# Patient Record
Sex: FEMALE | Race: WHITE | Employment: UNEMPLOYED | URBAN - METROPOLITAN AREA
[De-identification: names, ages, dates, MRNs, and addresses within clinical notes are randomized per-mention and may not be internally consistent; named-entity substitution may affect disease eponyms.]

---

## 2019-01-08 ENCOUNTER — HOSPITAL ENCOUNTER (OUTPATIENT)
Dept: PREADMISSION TESTING | Age: 54
Discharge: HOME | End: 2019-01-08
Payer: SELF-PAY

## 2019-01-08 VITALS
WEIGHT: 128.13 LBS | HEART RATE: 68 BPM | RESPIRATION RATE: 18 BRPM | TEMPERATURE: 96.7 F | BODY MASS INDEX: 23.58 KG/M2 | HEIGHT: 62 IN | SYSTOLIC BLOOD PRESSURE: 130 MMHG | DIASTOLIC BLOOD PRESSURE: 83 MMHG

## 2019-01-08 LAB
ANION GAP SERPL CALC-SCNC: 6 MMOL/L (ref 5–15)
BASOPHILS # BLD: 0 K/UL (ref 0–0.1)
BASOPHILS NFR BLD: 0 % (ref 0–1)
BUN SERPL-MCNC: 17 MG/DL (ref 6–20)
BUN/CREAT SERPL: 21 (ref 12–20)
CALCIUM SERPL-MCNC: 8.9 MG/DL (ref 8.5–10.1)
CHLORIDE SERPL-SCNC: 105 MMOL/L (ref 97–108)
CO2 SERPL-SCNC: 28 MMOL/L (ref 21–32)
CREAT SERPL-MCNC: 0.8 MG/DL (ref 0.55–1.02)
DIFFERENTIAL METHOD BLD: NORMAL
EOSINOPHIL # BLD: 0 K/UL (ref 0–0.4)
EOSINOPHIL NFR BLD: 1 % (ref 0–7)
ERYTHROCYTE [DISTWIDTH] IN BLOOD BY AUTOMATED COUNT: 12.6 % (ref 11.5–14.5)
GLUCOSE SERPL-MCNC: 91 MG/DL (ref 65–100)
HCT VFR BLD AUTO: 40.3 % (ref 35–47)
HGB BLD-MCNC: 13.3 G/DL (ref 11.5–16)
IMM GRANULOCYTES # BLD: 0 K/UL (ref 0–0.04)
IMM GRANULOCYTES NFR BLD AUTO: 0 % (ref 0–0.5)
LYMPHOCYTES # BLD: 1.4 K/UL (ref 0.8–3.5)
LYMPHOCYTES NFR BLD: 29 % (ref 12–49)
MCH RBC QN AUTO: 31.4 PG (ref 26–34)
MCHC RBC AUTO-ENTMCNC: 33 G/DL (ref 30–36.5)
MCV RBC AUTO: 95 FL (ref 80–99)
MONOCYTES # BLD: 0.4 K/UL (ref 0–1)
MONOCYTES NFR BLD: 9 % (ref 5–13)
NEUTS SEG # BLD: 2.9 K/UL (ref 1.8–8)
NEUTS SEG NFR BLD: 61 % (ref 32–75)
NRBC # BLD: 0 K/UL (ref 0–0.01)
NRBC BLD-RTO: 0 PER 100 WBC
PLATELET # BLD AUTO: 324 K/UL (ref 150–400)
PMV BLD AUTO: 9.4 FL (ref 8.9–12.9)
POTASSIUM SERPL-SCNC: 4.2 MMOL/L (ref 3.5–5.1)
RBC # BLD AUTO: 4.24 M/UL (ref 3.8–5.2)
SODIUM SERPL-SCNC: 139 MMOL/L (ref 136–145)
WBC # BLD AUTO: 4.8 K/UL (ref 3.6–11)

## 2019-01-08 PROCEDURE — 36415 COLL VENOUS BLD VENIPUNCTURE: CPT

## 2019-01-08 PROCEDURE — 85025 COMPLETE CBC W/AUTO DIFF WBC: CPT

## 2019-01-08 PROCEDURE — 80048 BASIC METABOLIC PNL TOTAL CA: CPT

## 2019-01-08 RX ORDER — CYCLOBENZAPRINE HCL 10 MG
10 TABLET ORAL
COMMUNITY
End: 2019-08-05

## 2019-01-08 RX ORDER — ACETAMINOPHEN 500 MG
2000 TABLET ORAL 2 TIMES DAILY
COMMUNITY
End: 2019-08-05

## 2019-01-21 ENCOUNTER — ANESTHESIA EVENT (OUTPATIENT)
Dept: MEDSURG UNIT | Age: 54
End: 2019-01-21
Payer: SELF-PAY

## 2019-01-21 NOTE — H&P
Timothy Idalia  : 1965  DOS: 2019  Chief Complaint: Consultation       Allergies:  No Non-Medication Allergies (NNMA), Compazine       Medications: Synthroid       Medical History: Height: 5' 2\", Weight: 120 lbs     Pulmonary System: Allergies  Cardiac System: Negative  Blood and Liver Systems: Negative  Neurologic and Endocrine Systems: Thyroid trouble  GI,  and Reproductive Systems: Negative  Cancer: Negative   Surgical History: Septoplasty Revision  Breast Augmentation Revision  Abdominoplasty  Breast Implant Revision  Septoplasty   Breast Augmentation       Family History: Heart Disease Father, Cancer Father, High Blood Pressure Mother       Social History: Smoking Status: Never smoker       Ms. Carolee Funes is a pleasant 51-year-old female who presents today with a desire to undergo some secondary breast surgery. She gives a history of primary saline sub muscular breast augmentation approximately 25 years ago with an unknown implant volume, style, or type. She developed a postoperative asymmetry and had a second breast augmentation in , secondary to a saline deflation where she had her implant exchanged for one of the same volume and type. Approximately 12 years ago she developed a capsular contracture on her left breast and had her saline implants exchanged for Allergan gel implants with a 397-cc volume on her right and a 330-cc volume on her left (?). She is interested in addressing the tightness and unnatural feel to her implants and remove her existing implants and exchanging for a new fifth generation cohesive gel device that is slightly larger. She also feels as though her right nipple is slightly lower than her left nipple. She has been pregnant once that resulted in a miscarriage. Her breast cancer history is positive in a paternal grandmother. She currently wears a size 34 D bra. Her last mammogram was in March of this year.     Examination demonstrates obvious palpable breast implants with a Bakers grade 2-3 on the left and a Bakers grade 1-2 on the right. Both implants are a little displaced in a cephalad direction. She does have some scarring and the right nipple is slightly lower than the left by a proximally 1 cm. I had a discussion with Ms. Edward Coreas regarding her physical exam and her desire to have additional elective breast surgery. She understands this is performed under a general anesthetic on an outpatient basis. I diagrammed on paper and her breasts where a pee areolar approach would be performed to gain access to her existing implants and her capsules. The old implant will be removed and total capsulectomy would be performed through a pee areolar approach. A new implant that is a smooth round cohesive silicone gel breast implant of a slightly larger volume would be placed. Additional efforts can be made on the right to elevate the nipple areolar complex approximately 1 cm to match her left side. All skin incisions are closed in layers with dissolvable sutures and a compression garment is used with associated activity restrictions while she is healing which can extend beyond 6 weeks. There is no exercise for 4 weeks. The risks and complications include but are not limited to infection, pain, bleeding, hematomas requiring re-operation, skin sensitivity changes, vascular compromise to tissues resulting in partial or complete loss of tissues, resultant open wounds, the need for long term wound care and dressing changes and scarring, irregularities and asymmetries requiring touch-up and revision surgery, an unacceptable cosmetic result, and other things including cardiac and pulmonary risks that include death. Additional implant specific risks include capsular contracture, scalloping or rippling, implant malposition, implant failure, and implant infection. We also discussed RAVEN-ALCL.     If she has an approximately 400 cc volume on the right and a 325-cc volume on the left and she wants to go slightly larger, I would possibly put a 450 on her right and a 375 on her left. We will be prepared with a selection of variable sizes intraoperatively. Her questions were answered and pictures were taken. Shell meet with Ramo Mckeon to discuss the fees.

## 2019-01-22 ENCOUNTER — HOSPITAL ENCOUNTER (EMERGENCY)
Age: 54
Discharge: HOME | End: 2019-01-23
Attending: EMERGENCY MEDICINE | Admitting: EMERGENCY MEDICINE
Payer: COMMERCIAL

## 2019-01-22 ENCOUNTER — ANESTHESIA (OUTPATIENT)
Dept: MEDSURG UNIT | Age: 54
End: 2019-01-22
Payer: SELF-PAY

## 2019-01-22 ENCOUNTER — HOSPITAL ENCOUNTER (OUTPATIENT)
Age: 54
Setting detail: OUTPATIENT SURGERY
Discharge: HOME | End: 2019-01-22
Attending: SURGERY | Admitting: SURGERY
Payer: SELF-PAY

## 2019-01-22 VITALS
WEIGHT: 128 LBS | DIASTOLIC BLOOD PRESSURE: 75 MMHG | OXYGEN SATURATION: 95 % | HEIGHT: 62 IN | HEART RATE: 94 BPM | TEMPERATURE: 97.5 F | RESPIRATION RATE: 16 BRPM | BODY MASS INDEX: 23.55 KG/M2 | SYSTOLIC BLOOD PRESSURE: 96 MMHG

## 2019-01-22 DIAGNOSIS — R51.9 NONINTRACTABLE HEADACHE, UNSPECIFIED CHRONICITY PATTERN, UNSPECIFIED HEADACHE TYPE: Primary | ICD-10-CM

## 2019-01-22 DIAGNOSIS — R11.2 NAUSEA AND VOMITING, INTRACTABILITY OF VOMITING NOT SPECIFIED, UNSPECIFIED VOMITING TYPE: ICD-10-CM

## 2019-01-22 PROBLEM — Z41.1 ENCOUNTER FOR COSMETIC SURGERY: Status: ACTIVE | Noted: 2019-01-22

## 2019-01-22 LAB
ANION GAP BLD CALC-SCNC: 17 MMOL/L (ref 10–20)
BUN BLD-MCNC: 12 MG/DL (ref 9–20)
CA-I BLD-MCNC: 1.09 MMOL/L (ref 1.12–1.32)
CHLORIDE BLD-SCNC: 102 MMOL/L (ref 98–107)
CO2 BLD-SCNC: 24 MMOL/L (ref 21–32)
COMMENT, HOLDF: NORMAL
CREAT BLD-MCNC: 0.7 MG/DL (ref 0.6–1.3)
GLUCOSE BLD-MCNC: 126 MG/DL (ref 65–100)
HCT VFR BLD CALC: 42 % (ref 35–47)
POTASSIUM BLD-SCNC: 3.8 MMOL/L (ref 3.5–5.1)
SAMPLES BEING HELD,HOLD: NORMAL
SERVICE CMNT-IMP: ABNORMAL
SODIUM BLD-SCNC: 139 MMOL/L (ref 136–145)

## 2019-01-22 PROCEDURE — 74011250637 HC RX REV CODE- 250/637: Performed by: PHYSICIAN ASSISTANT

## 2019-01-22 PROCEDURE — 77030026227 HC FUNL KELLR 2 PCH ALGN -C: Performed by: SURGERY

## 2019-01-22 PROCEDURE — 77030026438 HC STYL ET INTUB CARD -A: Performed by: ANESTHESIOLOGY

## 2019-01-22 PROCEDURE — 77030019908 HC STETH ESOPH SIMS -A: Performed by: ANESTHESIOLOGY

## 2019-01-22 PROCEDURE — 80047 BASIC METABLC PNL IONIZED CA: CPT

## 2019-01-22 PROCEDURE — 76060000063 HC AMB SURG ANES 1.5 TO 2 HR: Performed by: SURGERY

## 2019-01-22 PROCEDURE — 77030018836 HC SOL IRR NACL ICUM -A: Performed by: SURGERY

## 2019-01-22 PROCEDURE — 74011250636 HC RX REV CODE- 250/636: Performed by: SURGERY

## 2019-01-22 PROCEDURE — 74011250636 HC RX REV CODE- 250/636

## 2019-01-22 PROCEDURE — 74011000250 HC RX REV CODE- 250: Performed by: SURGERY

## 2019-01-22 PROCEDURE — 76210000046 HC AMBSU PH II REC FIRST 0.5 HR: Performed by: SURGERY

## 2019-01-22 PROCEDURE — 74011000250 HC RX REV CODE- 250

## 2019-01-22 PROCEDURE — 76210000036 HC AMBSU PH I REC 1.5 TO 2 HR: Performed by: SURGERY

## 2019-01-22 PROCEDURE — 96375 TX/PRO/DX INJ NEW DRUG ADDON: CPT

## 2019-01-22 PROCEDURE — 77030008684 HC TU ET CUF COVD -B: Performed by: ANESTHESIOLOGY

## 2019-01-22 PROCEDURE — 74011000272 HC RX REV CODE- 272: Performed by: SURGERY

## 2019-01-22 PROCEDURE — 77030002933 HC SUT MCRYL J&J -A: Performed by: SURGERY

## 2019-01-22 PROCEDURE — 77030032490 HC SLV COMPR SCD KNE COVD -B: Performed by: SURGERY

## 2019-01-22 PROCEDURE — 76030000003 HC AMB SURG OR TIME 1.5 TO 2: Performed by: SURGERY

## 2019-01-22 PROCEDURE — 96361 HYDRATE IV INFUSION ADD-ON: CPT

## 2019-01-22 PROCEDURE — 77030011264 HC ELECTRD BLD EXT COVD -A: Performed by: SURGERY

## 2019-01-22 PROCEDURE — 77030031139 HC SUT VCRL2 J&J -A: Performed by: SURGERY

## 2019-01-22 PROCEDURE — 74011250636 HC RX REV CODE- 250/636: Performed by: PHYSICIAN ASSISTANT

## 2019-01-22 PROCEDURE — 77030020263 HC SOL INJ SOD CL0.9% LFCR 1000ML: Performed by: SURGERY

## 2019-01-22 PROCEDURE — 99283 EMERGENCY DEPT VISIT LOW MDM: CPT

## 2019-01-22 PROCEDURE — 77030011640 HC PAD GRND REM COVD -A: Performed by: SURGERY

## 2019-01-22 PROCEDURE — 96374 THER/PROPH/DIAG INJ IV PUSH: CPT

## 2019-01-22 PROCEDURE — 77030020782 HC GWN BAIR PAWS FLX 3M -B

## 2019-01-22 DEVICE — SMOOTH ROUND MODERATE PLUS PROFILE GEL-FILLED BREAST IMPLANT, 425CC  SMOOTH ROUND SILICONE
Type: IMPLANTABLE DEVICE | Site: BREAST | Status: FUNCTIONAL
Brand: MENTOR MEMORYGEL BREAST IMPLANT

## 2019-01-22 RX ORDER — SODIUM CHLORIDE 0.9 % (FLUSH) 0.9 %
5-40 SYRINGE (ML) INJECTION EVERY 8 HOURS
Status: DISCONTINUED | OUTPATIENT
Start: 2019-01-22 | End: 2019-01-22 | Stop reason: HOSPADM

## 2019-01-22 RX ORDER — MIDAZOLAM HYDROCHLORIDE 1 MG/ML
INJECTION, SOLUTION INTRAMUSCULAR; INTRAVENOUS AS NEEDED
Status: DISCONTINUED | OUTPATIENT
Start: 2019-01-22 | End: 2019-01-22 | Stop reason: HOSPADM

## 2019-01-22 RX ORDER — BUTALBITAL, ACETAMINOPHEN AND CAFFEINE 50; 325; 40 MG/1; MG/1; MG/1
2 TABLET ORAL
Status: COMPLETED | OUTPATIENT
Start: 2019-01-22 | End: 2019-01-22

## 2019-01-22 RX ORDER — SODIUM CHLORIDE 9 MG/ML
1000 INJECTION, SOLUTION INTRAVENOUS ONCE
Status: COMPLETED | OUTPATIENT
Start: 2019-01-22 | End: 2019-01-23

## 2019-01-22 RX ORDER — SUCCINYLCHOLINE CHLORIDE 20 MG/ML
INJECTION INTRAMUSCULAR; INTRAVENOUS AS NEEDED
Status: DISCONTINUED | OUTPATIENT
Start: 2019-01-22 | End: 2019-01-22 | Stop reason: HOSPADM

## 2019-01-22 RX ORDER — ONDANSETRON 2 MG/ML
INJECTION INTRAMUSCULAR; INTRAVENOUS AS NEEDED
Status: DISCONTINUED | OUTPATIENT
Start: 2019-01-22 | End: 2019-01-22 | Stop reason: HOSPADM

## 2019-01-22 RX ORDER — HYDROMORPHONE HYDROCHLORIDE 2 MG/ML
INJECTION, SOLUTION INTRAMUSCULAR; INTRAVENOUS; SUBCUTANEOUS AS NEEDED
Status: DISCONTINUED | OUTPATIENT
Start: 2019-01-22 | End: 2019-01-22 | Stop reason: HOSPADM

## 2019-01-22 RX ORDER — SODIUM CHLORIDE 0.9 % (FLUSH) 0.9 %
5-40 SYRINGE (ML) INJECTION AS NEEDED
Status: DISCONTINUED | OUTPATIENT
Start: 2019-01-22 | End: 2019-01-22 | Stop reason: HOSPADM

## 2019-01-22 RX ORDER — ONDANSETRON 2 MG/ML
4 INJECTION INTRAMUSCULAR; INTRAVENOUS AS NEEDED
Status: DISCONTINUED | OUTPATIENT
Start: 2019-01-22 | End: 2019-01-22 | Stop reason: HOSPADM

## 2019-01-22 RX ORDER — FENTANYL CITRATE 50 UG/ML
25 INJECTION, SOLUTION INTRAMUSCULAR; INTRAVENOUS
Status: DISCONTINUED | OUTPATIENT
Start: 2019-01-22 | End: 2019-01-22 | Stop reason: HOSPADM

## 2019-01-22 RX ORDER — NEOSTIGMINE METHYLSULFATE 1 MG/ML
INJECTION INTRAVENOUS AS NEEDED
Status: DISCONTINUED | OUTPATIENT
Start: 2019-01-22 | End: 2019-01-22 | Stop reason: HOSPADM

## 2019-01-22 RX ORDER — ROCURONIUM BROMIDE 10 MG/ML
INJECTION, SOLUTION INTRAVENOUS AS NEEDED
Status: DISCONTINUED | OUTPATIENT
Start: 2019-01-22 | End: 2019-01-22 | Stop reason: HOSPADM

## 2019-01-22 RX ORDER — SODIUM CHLORIDE, SODIUM LACTATE, POTASSIUM CHLORIDE, CALCIUM CHLORIDE 600; 310; 30; 20 MG/100ML; MG/100ML; MG/100ML; MG/100ML
50 INJECTION, SOLUTION INTRAVENOUS CONTINUOUS
Status: DISCONTINUED | OUTPATIENT
Start: 2019-01-22 | End: 2019-01-22 | Stop reason: HOSPADM

## 2019-01-22 RX ORDER — DEXAMETHASONE SODIUM PHOSPHATE 4 MG/ML
INJECTION, SOLUTION INTRA-ARTICULAR; INTRALESIONAL; INTRAMUSCULAR; INTRAVENOUS; SOFT TISSUE AS NEEDED
Status: DISCONTINUED | OUTPATIENT
Start: 2019-01-22 | End: 2019-01-22 | Stop reason: HOSPADM

## 2019-01-22 RX ORDER — LIDOCAINE HYDROCHLORIDE 20 MG/ML
INJECTION, SOLUTION EPIDURAL; INFILTRATION; INTRACAUDAL; PERINEURAL AS NEEDED
Status: DISCONTINUED | OUTPATIENT
Start: 2019-01-22 | End: 2019-01-22 | Stop reason: HOSPADM

## 2019-01-22 RX ORDER — SODIUM CHLORIDE, SODIUM LACTATE, POTASSIUM CHLORIDE, CALCIUM CHLORIDE 600; 310; 30; 20 MG/100ML; MG/100ML; MG/100ML; MG/100ML
INJECTION, SOLUTION INTRAVENOUS
Status: DISCONTINUED | OUTPATIENT
Start: 2019-01-22 | End: 2019-01-22 | Stop reason: HOSPADM

## 2019-01-22 RX ORDER — KETOROLAC TROMETHAMINE 30 MG/ML
15 INJECTION, SOLUTION INTRAMUSCULAR; INTRAVENOUS
Status: COMPLETED | OUTPATIENT
Start: 2019-01-22 | End: 2019-01-22

## 2019-01-22 RX ORDER — CEFAZOLIN SODIUM/WATER 2 G/20 ML
2 SYRINGE (ML) INTRAVENOUS ONCE
Status: COMPLETED | OUTPATIENT
Start: 2019-01-22 | End: 2019-01-22

## 2019-01-22 RX ORDER — PROPOFOL 10 MG/ML
INJECTION, EMULSION INTRAVENOUS AS NEEDED
Status: DISCONTINUED | OUTPATIENT
Start: 2019-01-22 | End: 2019-01-22 | Stop reason: HOSPADM

## 2019-01-22 RX ORDER — LIDOCAINE HYDROCHLORIDE 10 MG/ML
0.1 INJECTION, SOLUTION EPIDURAL; INFILTRATION; INTRACAUDAL; PERINEURAL AS NEEDED
Status: DISCONTINUED | OUTPATIENT
Start: 2019-01-22 | End: 2019-01-22 | Stop reason: HOSPADM

## 2019-01-22 RX ORDER — PHENYLEPHRINE HCL IN 0.9% NACL 0.4MG/10ML
SYRINGE (ML) INTRAVENOUS AS NEEDED
Status: DISCONTINUED | OUTPATIENT
Start: 2019-01-22 | End: 2019-01-22 | Stop reason: HOSPADM

## 2019-01-22 RX ORDER — METOCLOPRAMIDE HYDROCHLORIDE 5 MG/ML
10 INJECTION INTRAMUSCULAR; INTRAVENOUS
Status: COMPLETED | OUTPATIENT
Start: 2019-01-22 | End: 2019-01-22

## 2019-01-22 RX ORDER — DIPHENHYDRAMINE HYDROCHLORIDE 50 MG/ML
25 INJECTION, SOLUTION INTRAMUSCULAR; INTRAVENOUS
Status: COMPLETED | OUTPATIENT
Start: 2019-01-22 | End: 2019-01-22

## 2019-01-22 RX ORDER — GLYCOPYRROLATE 0.2 MG/ML
INJECTION INTRAMUSCULAR; INTRAVENOUS AS NEEDED
Status: DISCONTINUED | OUTPATIENT
Start: 2019-01-22 | End: 2019-01-22 | Stop reason: HOSPADM

## 2019-01-22 RX ORDER — FENTANYL CITRATE 50 UG/ML
INJECTION, SOLUTION INTRAMUSCULAR; INTRAVENOUS AS NEEDED
Status: DISCONTINUED | OUTPATIENT
Start: 2019-01-22 | End: 2019-01-22 | Stop reason: HOSPADM

## 2019-01-22 RX ADMIN — GLYCOPYRROLATE 0.5 MG: 0.2 INJECTION INTRAMUSCULAR; INTRAVENOUS at 13:11

## 2019-01-22 RX ADMIN — SODIUM CHLORIDE 1000 ML/HR: 900 INJECTION, SOLUTION INTRAVENOUS at 23:30

## 2019-01-22 RX ADMIN — Medication 80 MCG: at 11:38

## 2019-01-22 RX ADMIN — SUCCINYLCHOLINE CHLORIDE 120 MG: 20 INJECTION INTRAMUSCULAR; INTRAVENOUS at 11:34

## 2019-01-22 RX ADMIN — LIDOCAINE HYDROCHLORIDE 50 MG: 20 INJECTION, SOLUTION EPIDURAL; INFILTRATION; INTRACAUDAL; PERINEURAL at 11:33

## 2019-01-22 RX ADMIN — FENTANYL CITRATE 50 MCG: 50 INJECTION, SOLUTION INTRAMUSCULAR; INTRAVENOUS at 11:33

## 2019-01-22 RX ADMIN — HYDROMORPHONE HYDROCHLORIDE 0.5 MG: 2 INJECTION, SOLUTION INTRAMUSCULAR; INTRAVENOUS; SUBCUTANEOUS at 11:58

## 2019-01-22 RX ADMIN — METOCLOPRAMIDE 10 MG: 5 INJECTION, SOLUTION INTRAMUSCULAR; INTRAVENOUS at 23:34

## 2019-01-22 RX ADMIN — ONDANSETRON 4 MG: 2 INJECTION INTRAMUSCULAR; INTRAVENOUS at 11:38

## 2019-01-22 RX ADMIN — Medication 2 G: at 11:42

## 2019-01-22 RX ADMIN — KETOROLAC TROMETHAMINE 15 MG: 30 INJECTION, SOLUTION INTRAMUSCULAR at 23:30

## 2019-01-22 RX ADMIN — PROPOFOL 150 MG: 10 INJECTION, EMULSION INTRAVENOUS at 11:33

## 2019-01-22 RX ADMIN — MIDAZOLAM HYDROCHLORIDE 2 MG: 1 INJECTION, SOLUTION INTRAMUSCULAR; INTRAVENOUS at 11:25

## 2019-01-22 RX ADMIN — DEXAMETHASONE SODIUM PHOSPHATE 8 MG: 4 INJECTION, SOLUTION INTRA-ARTICULAR; INTRALESIONAL; INTRAMUSCULAR; INTRAVENOUS; SOFT TISSUE at 11:38

## 2019-01-22 RX ADMIN — NEOSTIGMINE METHYLSULFATE 3 MG: 1 INJECTION INTRAVENOUS at 13:11

## 2019-01-22 RX ADMIN — DIPHENHYDRAMINE HYDROCHLORIDE 25 MG: 50 INJECTION, SOLUTION INTRAMUSCULAR; INTRAVENOUS at 23:32

## 2019-01-22 RX ADMIN — Medication 40 MCG: at 11:36

## 2019-01-22 RX ADMIN — ROCURONIUM BROMIDE 25 MG: 10 INJECTION, SOLUTION INTRAVENOUS at 11:40

## 2019-01-22 RX ADMIN — Medication 80 MCG: at 11:43

## 2019-01-22 RX ADMIN — SODIUM CHLORIDE, SODIUM LACTATE, POTASSIUM CHLORIDE, CALCIUM CHLORIDE: 600; 310; 30; 20 INJECTION, SOLUTION INTRAVENOUS at 11:15

## 2019-01-22 RX ADMIN — ONDANSETRON 4 MG: 2 INJECTION INTRAMUSCULAR; INTRAVENOUS at 13:15

## 2019-01-22 RX ADMIN — ROCURONIUM BROMIDE 5 MG: 10 INJECTION, SOLUTION INTRAVENOUS at 11:33

## 2019-01-22 RX ADMIN — BUTALBITAL, ACETAMINOPHEN AND CAFFEINE 2 TABLET: 50; 325; 40 TABLET ORAL at 23:34

## 2019-01-22 RX ADMIN — HYDROMORPHONE HYDROCHLORIDE 0.5 MG: 2 INJECTION, SOLUTION INTRAMUSCULAR; INTRAVENOUS; SUBCUTANEOUS at 13:21

## 2019-01-22 RX ADMIN — FENTANYL CITRATE 50 MCG: 50 INJECTION, SOLUTION INTRAMUSCULAR; INTRAVENOUS at 11:25

## 2019-01-22 NOTE — INTERVAL H&P NOTE
H&P Update:  Keven Lorenz was seen and examined. History and physical has been reviewed. The patient has been examined.  There have been no significant clinical changes since the completion of the originally dated History and Physical.    Signed By: Dewayne Romero MD     January 22, 2019 10:55 AM

## 2019-01-22 NOTE — ANESTHESIA POSTPROCEDURE EVALUATION
Post-Anesthesia Evaluation and Assessment    Patient: Sumit Lucas MRN: [id number]  SSN: [id number]    YOB: 1965  Age: 48 y.o. Sex: female      I have evaluated the patient and they are stable and ready for discharge from the PACU. Cardiovascular Function/Vital Signs  Visit Vitals  /60   Pulse (!) 101   Temp 36.4 °C (97.5 °F)   Resp 16   Ht 5' 2\" (1.575 m)   Wt 58.1 kg (128 lb)   SpO2 96%   BMI 23.41 kg/m²       Patient is status post General anesthesia for Procedure(s):  BILATERAL BREAST IMPLANT REMOVAL AND REPLACEMENT. Nausea/Vomiting: None    Postoperative hydration reviewed and adequate. Pain:  Pain Scale 1: FLACC (01/22/19 1320)  Pain Intensity 1: 0 (01/22/19 1320)   Managed    Neurological Status:   Neuro (WDL): Exceptions to WDL (01/22/19 1320)  Neuro  Neurologic State: Eyes open to voice (01/22/19 1320)  LUE Motor Response: Purposeful (01/22/19 1320)  LLE Motor Response: Purposeful (01/22/19 1320)  RUE Motor Response: Purposeful (01/22/19 1320)  RLE Motor Response: Purposeful (01/22/19 1320)   At baseline    Mental Status, Level of Consciousness: Alert and  oriented to person, place, and time    Pulmonary Status:   O2 Device: Room air (01/22/19 1324)   Adequate oxygenation and airway patent    Complications related to anesthesia: None    Post-anesthesia assessment completed.  No concerns    Signed By: Alberto Jackman MD     January 22, 2019              Procedure(s):  BILATERAL BREAST IMPLANT REMOVAL AND REPLACEMENT.    <BSHSIANPOST>    Visit Vitals  /60   Pulse (!) 101   Temp 36.4 °C (97.5 °F)   Resp 16   Ht 5' 2\" (1.575 m)   Wt 58.1 kg (128 lb)   SpO2 96%   BMI 23.41 kg/m²

## 2019-01-22 NOTE — BRIEF OP NOTE
BRIEF OPERATIVE NOTE    Date of Procedure: 1/22/2019   Preoperative Diagnosis: COSMETIC  Postoperative Diagnosis: COSMETIC    Procedure(s):  BILATERAL BREAST IMPLANT REMOVAL AND REPLACEMENT  Surgeon(s) and Role:     * Lawanda San MD - Primary         Surgical Assistant: Karen/Elissa/Airam    Surgical Staff:  Circ-1: Machelle Jones, RN  Registered Nurse First Assistant: Danny Guadalupe RN  Scrub Tech-1: Marely Etienne  Scrub RN-Relief: Lucas, [place], RN  Event Time In Time Out   Incision Start 1157    Incision Close 1310      Anesthesia: General   Estimated Blood Loss: 5  Specimens: * No specimens in log *   Findings: ruptered   Complications: none  Implants:   Implant Name Type Inv.  Item Serial No.  Lot No. LRB No. Used Action   SMOOTH ROUND MODERATE PROFILE GEL-FILLED BREAST IMPLANT   [id number] [employer] [id number] Left 1 Implanted   IMPL BRST RND MP 425ML --  - [id number]  IMPL BRST RND MP 425ML --  [id number] [employer] [id number] Right 1 Implanted

## 2019-01-22 NOTE — DISCHARGE INSTRUCTIONS
Leave the surgical garment ON and DRY for 48 hours, then may remove for shower. Resume any pre op medications and diet BUT use sport drinks like gator aid or power aid instead of water and extra protein in diet helps wounds heal.  Keep head elevated at night when you sleep about 35 degrees, do not lay flat for about 2 weeks  Walk every 1-2 hours during the day in house for 5-10 minutes during the first 2 weeks  May tub bathe only during the first 2 days  Use stool softeners to prevent constipation  Do not use pain mediations on an empty stomach  NO strenuous activity for 4 weeks  When you shower, remove the bra/garment and the white elastic implant strap, discard any lose gauze, shower, place antibiotic ointment of choice over the nipple incisions with clean gauze, and place the second garment on as you found the first one. Be sure to safety pin the white elastic strap to the back/bottom of the bra as you found it, and use for 2 weeks. Call DR. Renae Gross at [phone] for questions or problems  Anticipate a phone call from Dr. Amara Xie from Nurse    PATIENT INSTRUCTIONS:    After general anesthesia or intravenous sedation, for 24 hours or while taking prescription Narcotics:  · Limit your activities  · Do not drive and operate hazardous machinery  · Do not make important personal or business decisions  · Do  not drink alcoholic beverages  · If you have not urinated within 8 hours after discharge, please contact your surgeon on call.     Report the following to your surgeon:  · Excessive pain, swelling, redness or odor of or around the surgical area  · Temperature over 100.5  · Nausea and vomiting lasting longer than 4 hours or if unable to take medications  · Any signs of decreased circulation or nerve impairment to extremity: change in color, persistent  numbness, tingling, coldness or increase pain  · Any questions    What to do at Home:  Recommended activity: See surgical instructions. If you experience any of the following symptoms as noted above, please follow up with Dr. Javier Patino. *  Please give a list of your current medications to your Primary Care Provider. *  Please update this list whenever your medications are discontinued, doses are      changed, or new medications (including over-the-counter products) are added. *  Please carry medication information at all times in case of emergency situations. These are general instructions for a healthy lifestyle:    No smoking/ No tobacco products/ Avoid exposure to second hand smoke  Surgeon General's Warning:  Quitting smoking now greatly reduces serious risk to your health. Obesity, smoking, and sedentary lifestyle greatly increases your risk for illness    A healthy diet, regular physical exercise & weight monitoring are important for maintaining a healthy lifestyle    You may be retaining fluid if you have a history of heart failure or if you experience any of the following symptoms:  Weight gain of 3 pounds or more overnight or 5 pounds in a week, increased swelling in our hands or feet or shortness of breath while lying flat in bed. Please call your doctor as soon as you notice any of these symptoms; do not wait until your next office visit. Recognize signs and symptoms of STROKE:    F-face looks uneven    A-arms unable to move or move unevenly    S-speech slurred or non-existent    T-time-call 911 as soon as signs and symptoms begin-DO NOT go       Back to bed or wait to see if you get better-TIME IS BRAIN. Warning Signs of HEART ATTACK     Call 911 if you have these symptoms:   Chest discomfort. Most heart attacks involve discomfort in the center of the chest that lasts more than a few minutes, or that goes away and comes back. It can feel like uncomfortable pressure, squeezing, fullness, or pain.  Discomfort in other areas of the upper body.  Symptoms can include pain or discomfort in one or both arms, the back, neck, jaw, or stomach.  Shortness of breath with or without chest discomfort.  Other signs may include breaking out in a cold sweat, nausea, or lightheadedness. Don't wait more than five minutes to call 911 - MINUTES MATTER! Fast action can save your life. Calling 911 is almost always the fastest way to get lifesaving treatment. Emergency Medical Services staff can begin treatment when they arrive -- up to an hour sooner than if someone gets to the hospital by car. The discharge information has been reviewed with the patient. The patient verbalized understanding. Discharge medications reviewed with the patient and appropriate educational materials and side effects teaching were provided.   ___________________________________________________________________________________________________________________________________

## 2019-01-22 NOTE — ANESTHESIA PREPROCEDURE EVALUATION
Anesthetic History     PONV          Review of Systems / Medical History  Patient summary reviewed, nursing notes reviewed and pertinent labs reviewed    Pulmonary                   Neuro/Psych              Cardiovascular                       GI/Hepatic/Renal                Endo/Other      Hypothyroidism       Other Findings              Physical Exam    Airway  Mallampati: I  TM Distance: > 6 cm  Neck ROM: normal range of motion   Mouth opening: Normal     Cardiovascular    Rhythm: regular  Rate: normal         Dental  No notable dental hx       Pulmonary  Breath sounds clear to auscultation               Abdominal         Other Findings            Anesthetic Plan    ASA: 2  Anesthesia type: general          Induction: Intravenous  Anesthetic plan and risks discussed with: Patient

## 2019-01-22 NOTE — ROUTINE PROCESS
Patient: Rainer Del Real MRN: [id number]  SSN: [id number]   YOB: 1965  Age: 48 y.o. Sex: female     Patient is status post Procedure(s):  BILATERAL BREAST IMPLANT REMOVAL AND REPLACEMENT. Surgeon(s) and Role:     * Letser Villela MD - Primary    Local/Dose/Irrigation:  See mar                  Peripheral IV 01/22/19 Left Antecubital (Active)   Site Assessment Clean, dry, & intact 1/22/2019 11:11 AM   Phlebitis Assessment 0 1/22/2019 11:11 AM   Infiltration Assessment 0 1/22/2019 11:11 AM   Dressing Status Clean, dry, & intact 1/22/2019 11:11 AM   Dressing Type Transparent;Tape 1/22/2019 11:11 AM   Hub Color/Line Status Pink; Infusing 1/22/2019 11:11 AM            Airway - Endotracheal Tube 01/22/19 Oral (Active)                   Dressing/Packing:  Wound Breast-DRESSING TYPE: 4 x 4;Adhesive wound closure strips (Steri-Strips); Adhesive wound dressing (Mastisol); Bra (01/22/19 1100)  Splint/Cast:  ]    Other:

## 2019-01-22 NOTE — OP NOTES
[address]   OPERATIVE REPORT    Elmira Odell  MR#: [id number]  : 1965  ACCOUNT #: [de-identified]   DATE OF SERVICE: 2019    PREOPERATIVE DIAGNOSIS:  Personal history of previous elective breast augmentation, submuscular silicone gel, postoperative asymmetry, desires implant exchange. POSTOPERATIVE DIAGNOSIS:  Personal history of previous elective breast augmentation, submuscular silicone gel postoperative asymmetry, desires implant exchange. PROCEDURE PERFORMED:  Bilateral implant exchange, periareolar approach and capsulorrhaphy adjustments bilateral breasts. SURGEON:  Tereza Oliver MD    ANESTHESIA:  General.    FINDINGS:  Normal with tight scar contracture, lower pole bilateral breasts. INDICATION FOR PROCEDURE:  The patient is a pleasant 26-year-old female who was seen in the [address] [place] [address] with a desire to undergo secondary revision breast surgery. She gives a history of a primary saline submuscular augmentation 25 years ago with an unknown implant volume, style, and type. She developed a postoperative asymmetry and a saline deflation and in  underwent an implant exchange by Dr. Raul Dowd. He placed an Allergan 397 mL volume on her right breast and 330 mL volume on the left. She was seen recently with some asymmetry and a scar contracture extending from the 6 o'clock position of the nipple areolar complexes towards the inframammary fold. She was interested in an implant exchange to a slightly larger device and preoperatively selected a 425 mL smooth round cohesive silicone gel breast implants for her right side, and a 385 mL implant on the left. She comes in today for this elective procedure. OPERATIVE PROCEDURE:  After appropriate consent was obtained, preoperative markings were placed. She was taken to the operating room and placed on the operative table in the supine position.   Satisfactory general endotracheal anesthesia was obtained. SCD devices were placed per routine. Our local anesthesia solution was injected around each breast for postoperative analgesia and her chest was sterilely prepped and draped. We began on the right side, performing the identical procedure bilateral,  beginning at the lower aspect of the nipple areolar complex. An inferior periareolar incision was made with a #10 scalpel blade and electrocautery was used to dissect through the subcutaneous tissues to the implant capsule. The old cohesive gel implant, which was identified, was a 397 mL implant on the right was removed. Adjustments were made in the capsule as radial relaxing incisions from the lower aspect of the nipple areolar complex towards the inframammary fold in an effort to address the contour defect and the constriction band below the nipple. Once this was performed, the pocket was inspected for hemostasis and was irrigated with bacitracin irrigation. At this point, a [place], reference number [id number] BC smooth round moderate plus profile 425 mL cohesive gel implant was placed in the pocket. This implant had a lot number [id number]. On the left side, the same inferior periareolar incision was made gaining access to the pocket with electrocautery. It was at this point; however, we identified the implant was ruptured with free flowing silicone gel material that was not cohesive in the pocket. Every attempt was made to remove as much of the gel as possible. The pocket was also irrigated with bacitracin irrigation and the same radial relaxing incisions in the capsule were made with the electrocautery under direct laparoscopic visualization. At this point, a [place], reference number [id number] [facility] smooth round moderate classic profile 385 mL implant was placed in the left pocket. That implant had a lot number [id number]. The patient was sat upright to inspect shape, size, and symmetry and was felt to be acceptable.   The inferior periareolar incisions were then  closed, reapproximating the deep tissues including any fascia or capsule with 3-0 Vicryl, the dermis with a 3-0 Vicryl, and the skin with a 4-0 Monocryl. Xeroform gauze, 4 x 4 gauze and a surgical bra with a wide implant strap were placed. At the end of the procedure, sponge and needle counts were reported as correct. ESTIMATED BLOOD LOSS:  5 mL. IV FLUIDS:  1000 mL. DRAINS:  None. IMPLANTS:  425 mL implant, right, 385 mL implant, left previously described. SPECIMENS REMOVED:  None. FINDINGS: Intraoperative identification of ruptured silicone gel implant left breast.    ASSISTANT:  Lorne Baker. STAFF:  OR staff  room 4, seventh floor. COMPLICATIONS:  None. She was awakened, extubated, and transferred to recovery room in satisfactory and stable condition. She will be discharged home today in the care of her family with prescriptions and instructions and will follow up with me within 2 weeks.       Fred Mckeon MD       [address] / JEF  D: 01/22/2019 13:32     T: 01/22/2019 13:54  JOB #: [id number]  CC: Sahil Roper MD

## 2019-01-23 VITALS
SYSTOLIC BLOOD PRESSURE: 114 MMHG | OXYGEN SATURATION: 100 % | TEMPERATURE: 98 F | RESPIRATION RATE: 16 BRPM | HEART RATE: 98 BPM | DIASTOLIC BLOOD PRESSURE: 77 MMHG

## 2019-01-23 PROCEDURE — 74011250636 HC RX REV CODE- 250/636: Performed by: PHYSICIAN ASSISTANT

## 2019-01-23 PROCEDURE — 96375 TX/PRO/DX INJ NEW DRUG ADDON: CPT

## 2019-01-23 PROCEDURE — 96361 HYDRATE IV INFUSION ADD-ON: CPT

## 2019-01-23 RX ORDER — LORAZEPAM 2 MG/ML
1 INJECTION INTRAMUSCULAR
Status: COMPLETED | OUTPATIENT
Start: 2019-01-23 | End: 2019-01-23

## 2019-01-23 RX ADMIN — LORAZEPAM 1 MG: 2 INJECTION INTRAMUSCULAR; INTRAVENOUS at 00:49

## 2019-01-23 NOTE — ED NOTES
I have reviewed discharge instructions with the patient. The patient verbalized understanding. VSS, respirations even and unlabored and in no acute distress. Ambulated out of the department with a steady gait. Patient's family member is driving her home.

## 2019-01-23 NOTE — ED TRIAGE NOTES
Patient presents to the emergency department reporting headache. Patient had breast surgery today. Patient arrived with nausea and vomiting.

## 2019-01-23 NOTE — DISCHARGE INSTRUCTIONS

## 2019-01-23 NOTE — ED PROVIDER NOTES
This is a 49 yo AAf with medical hx remarkable for migraines, and thyroid disease presenting ambulatory to the ED with complaint of HA, nausea, vomiting and photophobia since late afternoon today. Pt had breast augmentation surgery today and apparently went home, ate and felt fine for several hrs, then developed HA, generalized with the associated nausea and vomiting. Taking hydrocodone, scopalamine patch in place and zofran with minimal improvement. Similar to prior migraines but has not had migraine in several years. Denies fever, neck stiffness, ear pain, sore throat, cough, CP, SOB, abdominal pain or urinary complaint. Also took medication with caffeine and aspirin.              Past Medical History:   Diagnosis Date    Migraines     Nausea & vomiting     Thyroid disease        Past Surgical History:   Procedure Laterality Date    ABDOMEN SURGERY PROC UNLISTED  2004    abdominoplasty    HX BREAST AUGMENTATION  1994    bilaterql breast augmentation    HX BREAST AUGMENTATION  2007    revision    HX COLONOSCOPY      HX GYN  2011    hysteroscopy    HX HEENT  2001 and 2013    septoplasty  x2    HX HEENT      WISDOM TEETH X4    HX HYSTERECTOMY  2015    HX ORTHOPAEDIC Right 2000     hand ganglion cystectomy         Family History:   Problem Relation Age of Onset    Hypertension Mother     Cancer Father         prostate/ non hodgkins lymphoma(remission), MULTIPLE MYELOMA    Thyroid Disease Sister     Anesth Problems Sister         POST OP NAUSEA    No Known Problems Brother        Social History     Socioeconomic History    Marital status: SINGLE     Spouse name: Not on file    Number of children: Not on file    Years of education: Not on file    Highest education level: Not on file   Social Needs    Financial resource strain: Not on file    Food insecurity - worry: Not on file    Food insecurity - inability: Not on file    Transportation needs - medical: Not on file   [employer] needs - non-medical: Not on file   Occupational History    Not on file   Tobacco Use    Smoking status: Never Smoker    Smokeless tobacco: Never Used   Substance and Sexual Activity    Alcohol use: Yes     Alcohol/week: 3.0 oz     Types: 5 Glasses of wine per week    Drug use: No    Sexual activity: Not on file   Other Topics Concern    Not on file   Social History Narrative    Not on file         ALLERGIES: Compazine [prochlorperazine edisylate]; Darvocet a500 [propoxyphene n-acetaminophen]; Percocet [oxycodone-acetaminophen]; and Prednisone    Review of Systems   Constitutional: Negative. Negative for chills, fatigue and fever. HENT: Negative. Negative for congestion, ear pain, facial swelling, rhinorrhea, sneezing and sore throat. Eyes: Positive for photophobia. Respiratory: Negative for cough, chest tightness and shortness of breath. Cardiovascular: Negative. Negative for chest pain and leg swelling. Gastrointestinal: Positive for nausea and vomiting. Negative for abdominal distention, abdominal pain, constipation and diarrhea. Genitourinary: Negative for difficulty urinating, frequency and urgency. Musculoskeletal: Negative for arthralgias, back pain, joint swelling, neck pain and neck stiffness. Skin: Negative for color change and rash. Neurological: Positive for headaches. Negative for dizziness and numbness. Psychiatric/Behavioral: Negative for confusion and decreased concentration. All other systems reviewed and are negative. Vitals:    01/22/19 2257 01/22/19 2304   BP:  134/87   Pulse: (!) 115 (!) 107   Resp:  18   Temp:  97.5 °F (36.4 °C)   SpO2: 99% 100%            Physical Exam   Constitutional: She is oriented to person, place, and time. She appears well-developed and well-nourished. No distress. Photophobic  female in NAD   HENT:   Head: Normocephalic and atraumatic.    Left Ear: External ear normal.   Eyes: Conjunctivae are normal. Pupils are equal, round, and reactive to light. Neck: Normal range of motion. Neck supple. No meningeal irritation   Cardiovascular: Normal rate, regular rhythm and normal heart sounds. Pulmonary/Chest: Effort normal. No respiratory distress. She has no wheezes. Abdominal: Soft. Bowel sounds are normal. She exhibits no distension. There is no tenderness. There is no rebound. Musculoskeletal: Normal range of motion. Neurological: She is alert and oriented to person, place, and time. No sensory deficit. She exhibits normal muscle tone. Coordination normal.   Skin: Skin is warm and dry. No ecchymosis, no laceration and no lesion noted. Psychiatric: She has a normal mood and affect. Her behavior is normal.   Nursing note and vitals reviewed. MDM  Number of Diagnoses or Management Options  Nausea and vomiting, intractability of vomiting not specified, unspecified vomiting type:   Nonintractable headache, unspecified chronicity pattern, unspecified headache type:   Diagnosis management comments: 49 yo  female with complaint of HA, N/V and photophobia similar to prior migraines. Plan  IVF  toradol  Benadryl   reglan  Reassess. Brie Barone           Procedures    Progress note    Pt report nausea and vomiting improved. HA significantly improved. Brie Barone    Patient's results have been reviewed with them. Patient and/or family have verbally conveyed their understanding and agreement of the patient's signs, symptoms, diagnosis, treatment and prognosis and additionally agree to follow up as recommended or return to the Emergency Room should their condition change prior to follow-up. Discharge instructions have also been provided to the patient with some educational information regarding their diagnosis as well a list of reasons why they would want to return to the ER prior to their follow-up appointment should their condition change.  Brie Barone      Discussed case with attending Physician Harper Gr. Agrees with care and will D/C with follow up.   April C [facility]

## 2019-08-05 ENCOUNTER — HOSPITAL ENCOUNTER (OUTPATIENT)
Dept: PREADMISSION TESTING | Age: 54
Discharge: HOME | End: 2019-08-05
Payer: SELF-PAY

## 2019-08-05 VITALS
WEIGHT: 127.25 LBS | SYSTOLIC BLOOD PRESSURE: 129 MMHG | HEART RATE: 61 BPM | BODY MASS INDEX: 23.42 KG/M2 | HEIGHT: 62 IN | TEMPERATURE: 97.3 F | DIASTOLIC BLOOD PRESSURE: 77 MMHG | RESPIRATION RATE: 18 BRPM

## 2019-08-05 LAB
BASOPHILS # BLD: 0 K/UL (ref 0–0.1)
BASOPHILS NFR BLD: 1 % (ref 0–1)
DIFFERENTIAL METHOD BLD: NORMAL
EOSINOPHIL # BLD: 0 K/UL (ref 0–0.4)
EOSINOPHIL NFR BLD: 0 % (ref 0–7)
ERYTHROCYTE [DISTWIDTH] IN BLOOD BY AUTOMATED COUNT: 12.5 % (ref 11.5–14.5)
HCT VFR BLD AUTO: 41.9 % (ref 35–47)
HGB BLD-MCNC: 13.9 G/DL (ref 11.5–16)
IMM GRANULOCYTES # BLD AUTO: 0 K/UL (ref 0–0.04)
IMM GRANULOCYTES NFR BLD AUTO: 0 % (ref 0–0.5)
LYMPHOCYTES # BLD: 1.4 K/UL (ref 0.8–3.5)
LYMPHOCYTES NFR BLD: 20 % (ref 12–49)
MCH RBC QN AUTO: 32 PG (ref 26–34)
MCHC RBC AUTO-ENTMCNC: 33.2 G/DL (ref 30–36.5)
MCV RBC AUTO: 96.3 FL (ref 80–99)
MONOCYTES # BLD: 0.6 K/UL (ref 0–1)
MONOCYTES NFR BLD: 9 % (ref 5–13)
NEUTS SEG # BLD: 4.7 K/UL (ref 1.8–8)
NEUTS SEG NFR BLD: 70 % (ref 32–75)
NRBC # BLD: 0 K/UL (ref 0–0.01)
NRBC BLD-RTO: 0 PER 100 WBC
PLATELET # BLD AUTO: 350 K/UL (ref 150–400)
PMV BLD AUTO: 10 FL (ref 8.9–12.9)
RBC # BLD AUTO: 4.35 M/UL (ref 3.8–5.2)
WBC # BLD AUTO: 6.8 K/UL (ref 3.6–11)

## 2019-08-05 PROCEDURE — 36415 COLL VENOUS BLD VENIPUNCTURE: CPT

## 2019-08-05 PROCEDURE — 85025 COMPLETE CBC W/AUTO DIFF WBC: CPT

## 2019-08-05 RX ORDER — GLUCOSAMINE SULFATE 1500 MG
2000 POWDER IN PACKET (EA) ORAL
COMMUNITY
End: 2021-09-08

## 2019-08-05 RX ORDER — OMEGA-3-ACID ETHYL ESTERS 1 G/1
2 CAPSULE, LIQUID FILLED ORAL 2 TIMES DAILY WITH MEALS
COMMUNITY
End: 2021-05-10

## 2019-08-05 NOTE — PERIOP NOTES
Preoperative instructions reviewed with patient. Patient given two bottles of CHG soap. Instructions on use of CHG soap. Patient given SSI infection FAQS sheet, Patient was given the opportunity to ask questions on the information provided.

## 2019-08-13 ENCOUNTER — ANESTHESIA (OUTPATIENT)
Dept: MEDSURG UNIT | Age: 54
End: 2019-08-13
Payer: SELF-PAY

## 2019-08-13 ENCOUNTER — ANESTHESIA EVENT (OUTPATIENT)
Dept: MEDSURG UNIT | Age: 54
End: 2019-08-13
Payer: SELF-PAY

## 2019-08-13 ENCOUNTER — HOSPITAL ENCOUNTER (OUTPATIENT)
Age: 54
Setting detail: OUTPATIENT SURGERY
Discharge: HOME | End: 2019-08-13
Attending: SURGERY | Admitting: SURGERY
Payer: SELF-PAY

## 2019-08-13 VITALS
DIASTOLIC BLOOD PRESSURE: 59 MMHG | TEMPERATURE: 97.7 F | BODY MASS INDEX: 23.37 KG/M2 | OXYGEN SATURATION: 99 % | RESPIRATION RATE: 20 BRPM | HEIGHT: 62 IN | HEART RATE: 76 BPM | SYSTOLIC BLOOD PRESSURE: 110 MMHG | WEIGHT: 127 LBS

## 2019-08-13 PROCEDURE — 76210000046 HC AMBSU PH II REC FIRST 0.5 HR: Performed by: SURGERY

## 2019-08-13 PROCEDURE — 77030008684 HC TU ET CUF COVD -B: Performed by: ANESTHESIOLOGY

## 2019-08-13 PROCEDURE — 77030031139 HC SUT VCRL2 J&J -A: Performed by: SURGERY

## 2019-08-13 PROCEDURE — 77030013079 HC BLNKT BAIR HGGR 3M -A: Performed by: ANESTHESIOLOGY

## 2019-08-13 PROCEDURE — 76210000035 HC AMBSU PH I REC 1 TO 1.5 HR: Performed by: SURGERY

## 2019-08-13 PROCEDURE — 77030020263 HC SOL INJ SOD CL0.9% LFCR 1000ML: Performed by: SURGERY

## 2019-08-13 PROCEDURE — 77030002916 HC SUT ETHLN J&J -A: Performed by: SURGERY

## 2019-08-13 PROCEDURE — 74011250636 HC RX REV CODE- 250/636: Performed by: ANESTHESIOLOGY

## 2019-08-13 PROCEDURE — 77030040361 HC SLV COMPR DVT MDII -B: Performed by: SURGERY

## 2019-08-13 PROCEDURE — 77030002986 HC SUT PROL J&J -A: Performed by: SURGERY

## 2019-08-13 PROCEDURE — 74011000250 HC RX REV CODE- 250: Performed by: SURGERY

## 2019-08-13 PROCEDURE — 74011000258 HC RX REV CODE- 258: Performed by: SURGERY

## 2019-08-13 PROCEDURE — 77030026438 HC STYL ET INTUB CARD -A: Performed by: ANESTHESIOLOGY

## 2019-08-13 PROCEDURE — 77030018836 HC SOL IRR NACL ICUM -A: Performed by: SURGERY

## 2019-08-13 PROCEDURE — 74011000250 HC RX REV CODE- 250: Performed by: NURSE ANESTHETIST, CERTIFIED REGISTERED

## 2019-08-13 PROCEDURE — 77030008467 HC STPLR SKN COVD -B: Performed by: SURGERY

## 2019-08-13 PROCEDURE — 74011250636 HC RX REV CODE- 250/636: Performed by: NURSE ANESTHETIST, CERTIFIED REGISTERED

## 2019-08-13 PROCEDURE — 74011000272 HC RX REV CODE- 272: Performed by: SURGERY

## 2019-08-13 PROCEDURE — 77030020782 HC GWN BAIR PAWS FLX 3M -B

## 2019-08-13 PROCEDURE — 77030002933 HC SUT MCRYL J&J -A: Performed by: SURGERY

## 2019-08-13 PROCEDURE — 77030002936 HC SUT MERS J&J -A: Performed by: SURGERY

## 2019-08-13 PROCEDURE — 76030000006 HC AMB SURG OR TIME 3 TO 3.5: Performed by: SURGERY

## 2019-08-13 PROCEDURE — 77030011266 HC ELECTRD BLD INSL COVD -A: Performed by: SURGERY

## 2019-08-13 PROCEDURE — 77030011640 HC PAD GRND REM COVD -A: Performed by: SURGERY

## 2019-08-13 PROCEDURE — 77030003666 HC NDL SPINAL BD -A: Performed by: SURGERY

## 2019-08-13 PROCEDURE — 74011250637 HC RX REV CODE- 250/637: Performed by: ANESTHESIOLOGY

## 2019-08-13 PROCEDURE — 76060000066 HC AMB SURG ANES 3 TO 3.5 HR: Performed by: SURGERY

## 2019-08-13 PROCEDURE — 74011250636 HC RX REV CODE- 250/636: Performed by: SURGERY

## 2019-08-13 PROCEDURE — 77030026227 HC FUNL KELLR 2 PCH ALGN -C: Performed by: SURGERY

## 2019-08-13 RX ORDER — EPHEDRINE SULFATE/0.9% NACL/PF 50 MG/5 ML
5 SYRINGE (ML) INTRAVENOUS AS NEEDED
Status: DISCONTINUED | OUTPATIENT
Start: 2019-08-13 | End: 2019-08-13 | Stop reason: HOSPADM

## 2019-08-13 RX ORDER — LIDOCAINE HYDROCHLORIDE 20 MG/ML
INJECTION, SOLUTION EPIDURAL; INFILTRATION; INTRACAUDAL; PERINEURAL AS NEEDED
Status: DISCONTINUED | OUTPATIENT
Start: 2019-08-13 | End: 2019-08-13 | Stop reason: HOSPADM

## 2019-08-13 RX ORDER — SODIUM CHLORIDE 9 MG/ML
1000 INJECTION, SOLUTION INTRAVENOUS CONTINUOUS
Status: DISCONTINUED | OUTPATIENT
Start: 2019-08-13 | End: 2019-08-13 | Stop reason: HOSPADM

## 2019-08-13 RX ORDER — DEXAMETHASONE SODIUM PHOSPHATE 4 MG/ML
INJECTION, SOLUTION INTRA-ARTICULAR; INTRALESIONAL; INTRAMUSCULAR; INTRAVENOUS; SOFT TISSUE AS NEEDED
Status: DISCONTINUED | OUTPATIENT
Start: 2019-08-13 | End: 2019-08-13 | Stop reason: HOSPADM

## 2019-08-13 RX ORDER — PROPOFOL 10 MG/ML
INJECTION, EMULSION INTRAVENOUS
Status: DISCONTINUED | OUTPATIENT
Start: 2019-08-13 | End: 2019-08-13 | Stop reason: HOSPADM

## 2019-08-13 RX ORDER — FENTANYL CITRATE 50 UG/ML
50 INJECTION, SOLUTION INTRAMUSCULAR; INTRAVENOUS AS NEEDED
Status: DISCONTINUED | OUTPATIENT
Start: 2019-08-13 | End: 2019-08-13 | Stop reason: HOSPADM

## 2019-08-13 RX ORDER — SODIUM CHLORIDE 9 MG/ML
50 INJECTION, SOLUTION INTRAVENOUS CONTINUOUS
Status: DISCONTINUED | OUTPATIENT
Start: 2019-08-13 | End: 2019-08-13 | Stop reason: HOSPADM

## 2019-08-13 RX ORDER — PROPOFOL 10 MG/ML
INJECTION, EMULSION INTRAVENOUS AS NEEDED
Status: DISCONTINUED | OUTPATIENT
Start: 2019-08-13 | End: 2019-08-13 | Stop reason: HOSPADM

## 2019-08-13 RX ORDER — FENTANYL CITRATE 50 UG/ML
25 INJECTION, SOLUTION INTRAMUSCULAR; INTRAVENOUS
Status: DISCONTINUED | OUTPATIENT
Start: 2019-08-13 | End: 2019-08-13 | Stop reason: HOSPADM

## 2019-08-13 RX ORDER — MIDAZOLAM HYDROCHLORIDE 1 MG/ML
0.5 INJECTION, SOLUTION INTRAMUSCULAR; INTRAVENOUS
Status: DISCONTINUED | OUTPATIENT
Start: 2019-08-13 | End: 2019-08-13 | Stop reason: HOSPADM

## 2019-08-13 RX ORDER — HYDROMORPHONE HYDROCHLORIDE 1 MG/ML
0.2 INJECTION, SOLUTION INTRAMUSCULAR; INTRAVENOUS; SUBCUTANEOUS
Status: DISCONTINUED | OUTPATIENT
Start: 2019-08-13 | End: 2019-08-13 | Stop reason: HOSPADM

## 2019-08-13 RX ORDER — DIPHENHYDRAMINE HYDROCHLORIDE 50 MG/ML
12.5 INJECTION, SOLUTION INTRAMUSCULAR; INTRAVENOUS AS NEEDED
Status: DISCONTINUED | OUTPATIENT
Start: 2019-08-13 | End: 2019-08-13 | Stop reason: HOSPADM

## 2019-08-13 RX ORDER — SODIUM CHLORIDE, SODIUM LACTATE, POTASSIUM CHLORIDE, CALCIUM CHLORIDE 600; 310; 30; 20 MG/100ML; MG/100ML; MG/100ML; MG/100ML
125 INJECTION, SOLUTION INTRAVENOUS CONTINUOUS
Status: DISCONTINUED | OUTPATIENT
Start: 2019-08-13 | End: 2019-08-13 | Stop reason: HOSPADM

## 2019-08-13 RX ORDER — MORPHINE SULFATE 10 MG/ML
2 INJECTION, SOLUTION INTRAMUSCULAR; INTRAVENOUS
Status: DISCONTINUED | OUTPATIENT
Start: 2019-08-13 | End: 2019-08-13 | Stop reason: HOSPADM

## 2019-08-13 RX ORDER — SODIUM CHLORIDE 0.9 % (FLUSH) 0.9 %
5-40 SYRINGE (ML) INJECTION EVERY 8 HOURS
Status: DISCONTINUED | OUTPATIENT
Start: 2019-08-13 | End: 2019-08-13 | Stop reason: HOSPADM

## 2019-08-13 RX ORDER — SODIUM CHLORIDE 0.9 % (FLUSH) 0.9 %
5-40 SYRINGE (ML) INJECTION AS NEEDED
Status: DISCONTINUED | OUTPATIENT
Start: 2019-08-13 | End: 2019-08-13 | Stop reason: HOSPADM

## 2019-08-13 RX ORDER — DEXMEDETOMIDINE HYDROCHLORIDE 100 UG/ML
INJECTION, SOLUTION INTRAVENOUS AS NEEDED
Status: DISCONTINUED | OUTPATIENT
Start: 2019-08-13 | End: 2019-08-13 | Stop reason: HOSPADM

## 2019-08-13 RX ORDER — CEFAZOLIN SODIUM/WATER 2 G/20 ML
2 SYRINGE (ML) INTRAVENOUS ONCE
Status: CANCELLED | OUTPATIENT
Start: 2019-08-13 | End: 2019-08-13

## 2019-08-13 RX ORDER — SCOLOPAMINE TRANSDERMAL SYSTEM 1 MG/1
1 PATCH, EXTENDED RELEASE TRANSDERMAL ONCE
Status: DISCONTINUED | OUTPATIENT
Start: 2019-08-13 | End: 2019-08-13 | Stop reason: HOSPADM

## 2019-08-13 RX ORDER — MIDAZOLAM HYDROCHLORIDE 1 MG/ML
1 INJECTION, SOLUTION INTRAMUSCULAR; INTRAVENOUS AS NEEDED
Status: DISCONTINUED | OUTPATIENT
Start: 2019-08-13 | End: 2019-08-13 | Stop reason: HOSPADM

## 2019-08-13 RX ORDER — ONDANSETRON 2 MG/ML
4 INJECTION INTRAMUSCULAR; INTRAVENOUS AS NEEDED
Status: DISCONTINUED | OUTPATIENT
Start: 2019-08-13 | End: 2019-08-13 | Stop reason: HOSPADM

## 2019-08-13 RX ORDER — MIDAZOLAM HYDROCHLORIDE 1 MG/ML
INJECTION, SOLUTION INTRAMUSCULAR; INTRAVENOUS AS NEEDED
Status: DISCONTINUED | OUTPATIENT
Start: 2019-08-13 | End: 2019-08-13 | Stop reason: HOSPADM

## 2019-08-13 RX ORDER — CEFAZOLIN SODIUM/WATER 2 G/20 ML
2 SYRINGE (ML) INTRAVENOUS ONCE
Status: COMPLETED | OUTPATIENT
Start: 2019-08-13 | End: 2019-08-13

## 2019-08-13 RX ORDER — ACETAMINOPHEN 10 MG/ML
INJECTION, SOLUTION INTRAVENOUS AS NEEDED
Status: DISCONTINUED | OUTPATIENT
Start: 2019-08-13 | End: 2019-08-13 | Stop reason: HOSPADM

## 2019-08-13 RX ORDER — PHENYLEPHRINE HCL IN 0.9% NACL 0.4MG/10ML
SYRINGE (ML) INTRAVENOUS AS NEEDED
Status: DISCONTINUED | OUTPATIENT
Start: 2019-08-13 | End: 2019-08-13 | Stop reason: HOSPADM

## 2019-08-13 RX ORDER — LIDOCAINE HYDROCHLORIDE 10 MG/ML
0.1 INJECTION, SOLUTION EPIDURAL; INFILTRATION; INTRACAUDAL; PERINEURAL AS NEEDED
Status: DISCONTINUED | OUTPATIENT
Start: 2019-08-13 | End: 2019-08-13 | Stop reason: HOSPADM

## 2019-08-13 RX ORDER — FENTANYL CITRATE 50 UG/ML
INJECTION, SOLUTION INTRAMUSCULAR; INTRAVENOUS AS NEEDED
Status: DISCONTINUED | OUTPATIENT
Start: 2019-08-13 | End: 2019-08-13 | Stop reason: HOSPADM

## 2019-08-13 RX ORDER — ROCURONIUM BROMIDE 10 MG/ML
INJECTION, SOLUTION INTRAVENOUS AS NEEDED
Status: DISCONTINUED | OUTPATIENT
Start: 2019-08-13 | End: 2019-08-13 | Stop reason: HOSPADM

## 2019-08-13 RX ADMIN — ROCURONIUM BROMIDE 30 MG: 10 SOLUTION INTRAVENOUS at 11:28

## 2019-08-13 RX ADMIN — ROCURONIUM BROMIDE 10 MG: 10 SOLUTION INTRAVENOUS at 12:32

## 2019-08-13 RX ADMIN — FENTANYL CITRATE 25 MCG: 50 INJECTION, SOLUTION INTRAMUSCULAR; INTRAVENOUS at 11:51

## 2019-08-13 RX ADMIN — PROPOFOL 30 MG: 10 INJECTION, EMULSION INTRAVENOUS at 12:00

## 2019-08-13 RX ADMIN — SUGAMMADEX 200 MG: 100 INJECTION, SOLUTION INTRAVENOUS at 13:17

## 2019-08-13 RX ADMIN — Medication 80 MCG: at 13:00

## 2019-08-13 RX ADMIN — PROPOFOL 150 MCG/KG/MIN: 10 INJECTION, EMULSION INTRAVENOUS at 11:28

## 2019-08-13 RX ADMIN — Medication 120 MCG: at 13:09

## 2019-08-13 RX ADMIN — DEXMEDETOMIDINE HYDROCHLORIDE 4 MCG: 100 INJECTION, SOLUTION, CONCENTRATE INTRAVENOUS at 11:38

## 2019-08-13 RX ADMIN — DEXMEDETOMIDINE HYDROCHLORIDE 4 MCG: 100 INJECTION, SOLUTION, CONCENTRATE INTRAVENOUS at 12:35

## 2019-08-13 RX ADMIN — MIDAZOLAM 2 MG: 1 INJECTION INTRAMUSCULAR; INTRAVENOUS at 11:20

## 2019-08-13 RX ADMIN — DEXMEDETOMIDINE HYDROCHLORIDE 4 MCG: 100 INJECTION, SOLUTION, CONCENTRATE INTRAVENOUS at 11:53

## 2019-08-13 RX ADMIN — ACETAMINOPHEN 1000 MG: 10 INJECTION, SOLUTION INTRAVENOUS at 12:18

## 2019-08-13 RX ADMIN — FENTANYL CITRATE 25 MCG: 50 INJECTION, SOLUTION INTRAMUSCULAR; INTRAVENOUS at 11:28

## 2019-08-13 RX ADMIN — Medication 80 MCG: at 12:05

## 2019-08-13 RX ADMIN — DEXAMETHASONE SODIUM PHOSPHATE 8 MG: 4 INJECTION, SOLUTION INTRAMUSCULAR; INTRAVENOUS at 11:31

## 2019-08-13 RX ADMIN — Medication 2 G: at 11:37

## 2019-08-13 RX ADMIN — LIDOCAINE HYDROCHLORIDE 80 MG: 20 INJECTION, SOLUTION EPIDURAL; INFILTRATION; INTRACAUDAL; PERINEURAL at 11:28

## 2019-08-13 RX ADMIN — SODIUM CHLORIDE, SODIUM LACTATE, POTASSIUM CHLORIDE, AND CALCIUM CHLORIDE 125 ML/HR: 600; 310; 30; 20 INJECTION, SOLUTION INTRAVENOUS at 10:37

## 2019-08-13 RX ADMIN — FENTANYL CITRATE 25 MCG: 50 INJECTION, SOLUTION INTRAMUSCULAR; INTRAVENOUS at 12:00

## 2019-08-13 RX ADMIN — PROPOFOL 50 MG: 10 INJECTION, EMULSION INTRAVENOUS at 12:32

## 2019-08-13 RX ADMIN — PROPOFOL 50 MG: 10 INJECTION, EMULSION INTRAVENOUS at 11:52

## 2019-08-13 RX ADMIN — PROMETHAZINE HYDROCHLORIDE 25 MG: 25 INJECTION INTRAMUSCULAR; INTRAVENOUS at 12:40

## 2019-08-13 RX ADMIN — PROPOFOL 120 MG: 10 INJECTION, EMULSION INTRAVENOUS at 11:28

## 2019-08-13 RX ADMIN — ROCURONIUM BROMIDE 10 MG: 10 SOLUTION INTRAVENOUS at 12:00

## 2019-08-13 NOTE — BRIEF OP NOTE
BRIEF OPERATIVE NOTE    Date of Procedure: 8/13/2019   Preoperative Diagnosis: COSMETIC  Postoperative Diagnosis: COSMETIC    Procedure(s):  UNILATERAL (LEFT) DONUT BREAST MASTOPEXY, UNILATERAL (RIGHT) BREAST IMPLANT REMOVAL AND EXCHANGE  Surgeon(s) and Role:     * Nikhil Schaffer MD - Primary         Surgical Assistant: Stella/rosalinda/Susan    Surgical Staff:  Circ-1: Purvi Lai RN  Circ-Relief: La Sandoval RN  Registered Nurse Assistant: Meena Christiansen RN  Scrub RN-1: Leopold Camps, RN  Scrub RN-Relief: Trinh Shipley RN  Event Time In Time Out   Incision Start 1150    Incision Close 1309      Anesthesia: General   Estimated Blood Loss: 20  Specimens:   ID Type Source Tests Collected by Time Destination   1 : IMPLANT REMOVED FROM RIGHT BREAST DISCARDED PER DR Daniel Cantu MD 8/13/2019 1315 Discarded      Findings: as expected   Complications: none  Implants:   Implant Name Type Inv.  Item Serial No.  Lot No. LRB No. Used Action   BREAST IMPLANT   [id number]  [id number] Right 1 Implanted

## 2019-08-13 NOTE — ROUTINE PROCESS
Patient: Tashi Asif MRN: [id number]  SSN: [id number]   YOB: 1965  Age: 48 y.o. Sex: female     Patient is status post Procedure(s):  UNILATERAL (LEFT) DONUT BREAST MASTOPEXY, UNILATERAL (RIGHT) BREAST IMPLANT REMOVAL AND EXCHANGE.     Surgeon(s) and Role:     * Garima Valencia MD - Primary    Local/Dose/Irrigation:  SEE MAR                  Peripheral IV 08/13/19 Left Forearm (Active)   Site Assessment Clean, dry, & intact 8/13/2019 10:34 AM   Dressing Status Clean, dry, & intact 8/13/2019 10:34 AM   Dressing Type Transparent 8/13/2019 10:34 AM                           Dressing/Packing:       Splint/Cast:  ]    Other:

## 2019-08-13 NOTE — ANESTHESIA PREPROCEDURE EVALUATION
Anesthetic History     PONV          Review of Systems / Medical History  Patient summary reviewed, nursing notes reviewed and pertinent labs reviewed    Pulmonary                   Neuro/Psych              Cardiovascular                       GI/Hepatic/Renal                Endo/Other      Hypothyroidism       Other Findings              Physical Exam    Airway  Mallampati: I  TM Distance: > 6 cm  Neck ROM: normal range of motion   Mouth opening: Normal     Cardiovascular    Rhythm: regular  Rate: normal         Dental  No notable dental hx       Pulmonary  Breath sounds clear to auscultation               Abdominal         Other Findings            Anesthetic Plan    ASA: 2  Anesthesia type: general and total IV anesthesia          Induction: Intravenous  Anesthetic plan and risks discussed with: Patient

## 2019-08-13 NOTE — H&P
Office Note      Chief Complaint: Consultation       Allergies: Percocet, No Non-Medication Allergies (NNMA), Compazine       Medications: Estradiol, spironolactone 50 mg oral tablet, Synthroid       Medical History: Height: 5' 2\", Weight: 120 lbs    Pulmonary System: Allergies  Cardiac System: Negative  Blood and Liver Systems: Negative  Neurologic and Endocrine Systems: Thyroid Trouble  GI,  and Reproductive Systems: Negative  Cancer: Negative   Surgical History: Partial hysterectomy (ovaries retained),  Septoplasty Revision,  Breast Augmentation Revision,  Abdominoplasty,  Breast Implant Revision,  Septoplasty,  Breast Augmentation       Family History: Heart Disease Father, High Blood Pressure Mother, Cancer Father       Social History: Smoking Status: Never smoker    Alcohol Use Drinks per week: 4-5     Pregnancy Number of pregnancies:1    Number of children:0              Ms. Meryle Salts is a 72-year-old female who is a 7-months postop from an implant exchange to a 385-cc moderate classic implant on the left, and a 425-cc moderate plus cohesive gel implant on the right. She had some soft tissue/scar efforts around the nipples on both sides. She has been seen a few times in follow-up, in is concerned about a persistent asymmetry regarding volume and appearance. Her left breast has a greater degree of projection and fullness despite the fact that the implant is 40 cc smaller. She is interested now in having a larger implant placed on the right, and efforts around the left nipple areolar complex to change the degree of projection and the appearance. We have discussed this in the past, and she is committed to an additional 50-75 cc of volume added to her right implant, and a pee areolar/doughnut mastopexy on the left side. Examination demonstrates her implants have a soft Bakers grade 1 capsule. She has obvious asymmetry from a volume standpoint with the left breast looking larger than the right.   She also has more central projection around the pee areolar location. She still has some flattening and a \"compression\" appearance between the nipple areolar complexes and inframammary folds, that existed before we operated on her from her previous surgery, that are less obvious since her last operation with me. I had a discussion with Ms. Meryle Salts regarding our planned revision procedure. I think we can clearly have both options available in the operating room, with either a 475 or 500 cc implant, and we can begin with a sizer to see how that added volume compares to her left side. That will be performed through her existing inferior pee areolar incisional scar. That incision will be closed in layers with dissolvable sutures. On the left side we will leave her implant alone, but we will proceed with a pee areolar/doughnut mastopexy to bring down the size of the areolar complex, and eliminate a small ring of the pee areolar skin which should address her degree of projection and the appearance of a larger breast on that side. The superficial skin is also closed with dissolvable suture, a compression garment is used with associated activity restrictions while she is healing, that can extend beyond 6 weeks. There is no exercise for 4 weeks. The risks and complications include but are not limited to infection, pain, bleeding, hematomas requiring re-operation, skin sensitivity changes, vascular compromise to tissues resulting in partial or complete loss of tissues, resultant open wounds, the need for long term wound care and dressing changes and scarring, irregularities and asymmetries requiring touch-up and revision surgery, an unacceptable cosmetic result, and other things including cardiac and pulmonary risks that include death. Additional implant risks include capsular contracture, scalloping or rippling, implant malposition, implant failure, and implant infection. We also discussed RAVEN-ALCL.   Her questions were answered and pictures were taken. She will finalize her plans with Chris Camacho.

## 2019-08-13 NOTE — ANESTHESIA POSTPROCEDURE EVALUATION
Procedure(s):  UNILATERAL (LEFT) DONUT BREAST MASTOPEXY, UNILATERAL (RIGHT) BREAST IMPLANT REMOVAL AND EXCHANGE. general, total IV anesthesia    Anesthesia Post Evaluation      Multimodal analgesia: multimodal analgesia used between 6 hours prior to anesthesia start to PACU discharge  Patient location during evaluation: PACU  Patient participation: complete - patient participated  Level of consciousness: awake  Pain score: 2  Pain management: adequate  Airway patency: patent  Anesthetic complications: no  Cardiovascular status: acceptable  Respiratory status: acceptable  Hydration status: acceptable  Comments: I have evaluated the patient and meets criteria for discharge from PACU. Ema Blanca MD  Post anesthesia nausea and vomiting:  controlled      Vitals Value Taken Time   BP 93/60 8/13/2019  2:30 PM   Temp 36.5 °C (97.7 °F) 8/13/2019  1:37 PM   Pulse 56 8/13/2019  2:35 PM   Resp 15 8/13/2019  2:35 PM   SpO2 97 % 8/13/2019  2:35 PM   Vitals shown include unvalidated device data.

## 2019-08-13 NOTE — DISCHARGE INSTRUCTIONS
Leave the surgical garment and dressings ON and DRY for 48 hours then may remove for shower  Resume any pre op medications and diet but use sport drinks like gator aid or power aid instead of water for 2-3 days and extra protein in diet helps wounds heal.  Keep head elevated at night when you sleep about 30 degrees for about 2 weeks  Walk every 1-2 hours during the day in house while awake for 5-10 minutes for the first 2 weeks  Use stool softeners to prevent constipation  Do not take pain mediations on an empty stomach  May tub bathe only for the first 48 hours  NO strenuous activity or exercise for 3-4 weeks  When you shower, remove the surgical garment and discard any lose gauze. Pay attention to the white elastic implant strap that is safety pinned to the back/bottom of the bra. Remove it and save it. Shower like you normally would. Place antibiotic ointment of choice over the nipple incisions and clean gauze, and place the second clean garment on as you found the first one. Be sure to safety pin the white elastic strap to the back/bottom of the bra like you found it, and use for 2 weeks. The purpose of the strap is to place gentle downward pressure on the implants. Call Dr. Jodi Sevilla at [phone] (cell) for questions or problems  Anticipate a phone call from Dr. Chey Turner were given IV tylenol at 1215 pm.  No additional tylenol or tylenol containing pain medication until 615 pm.        DISCHARGE SUMMARY from Nurse    PATIENT INSTRUCTIONS:    After general anesthesia or intravenous sedation, for 24 hours or while taking prescription Narcotics:  · Limit your activities  · Do not drive and operate hazardous machinery  · Do not make important personal or business decisions  · Do  not drink alcoholic beverages  · If you have not urinated within 8 hours after discharge, please contact your surgeon on call.     Report the following to your surgeon:  · Excessive pain, swelling, redness or odor of or around the surgical area  · Temperature over 100.5  · Nausea and vomiting lasting longer than 4 hours or if unable to take medications  · Any signs of decreased circulation or nerve impairment to extremity: change in color, persistent  numbness, tingling, coldness or increase pain  · Any questions    What to do at Home:  Recommended activity: See surgical instructions,     If you experience any of the following symptoms as instructed, please follow up with Dr Keke Nunez. *  Please give a list of your current medications to your Primary Care Provider. *  Please update this list whenever your medications are discontinued, doses are      changed, or new medications (including over-the-counter products) are added. *  Please carry medication information at all times in case of emergency situations. These are general instructions for a healthy lifestyle:    No smoking/ No tobacco products/ Avoid exposure to second hand smoke  Surgeon General's Warning:  Quitting smoking now greatly reduces serious risk to your health. Obesity, smoking, and sedentary lifestyle greatly increases your risk for illness    A healthy diet, regular physical exercise & weight monitoring are important for maintaining a healthy lifestyle    You may be retaining fluid if you have a history of heart failure or if you experience any of the following symptoms:  Weight gain of 3 pounds or more overnight or 5 pounds in a week, increased swelling in our hands or feet or shortness of breath while lying flat in bed. Please call your doctor as soon as you notice any of these symptoms; do not wait until your next office visit. The discharge information has been reviewed with the patient. The patient verbalized understanding.   Discharge medications reviewed with the patient and appropriate educational materials and side effects teaching were provided.   ___________________________________________________________________________________________________________________________________

## 2019-08-14 NOTE — OP NOTES
[address]   OPERATIVE REPORT    Name:  Tianna Kincaid  MR#:  [id number]  :  1965  ACCOUNT #:  [de-identified]  DATE OF SERVICE:  2019      PREOPERATIVE DIAGNOSES:  Personal history of previous elective breast surgery, augmentation, mastopexy, persistent postoperative asymmetry regarding volume and shape, desires secondary revision elective breast surgery. POSTOPERATIVE DIAGNOSES:  Personal history of previous elective breast surgery, augmentation, mastopexy, persistent postoperative asymmetry regarding volume and shape, desires secondary revision elective breast surgery. PROCEDURES PERFORMED:  Implant exchange on the right through inferior periareolar approach, the left was periareolar donut mastopexy. SURGEON:  Loyd Menendez MD    ASSISTANT:  Renetta Alston. ANESTHESIA:  General.    COMPLICATIONS:  None. SPECIMENS REMOVED:  Old 425 mL gel implant, right breast removed and discarded. Other than old implant, none. IMPLANTS:  New 475 mL smooth, round, cohesive gel implant placed on the right breast previously described. ESTIMATED BLOOD LOSS:  Approximately 20 mL. IV FLUIDS:  Approximately less than or equal to 1000 mL. DRAINS:  None. STAFF:  OR room #1, 7th floor, [address] [place] [address] Avenue:  The patient is a pleasant 51-year-old female who had previously undergone breast augmentation and periareolar mastopexy by separate surgeon a number of years ago. 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 She also had a significant contour defect between the nipple-areolar complex and inframammary fold bilaterally. Approximately 7 months ago, underwent bilateral total capsulectomy then implant exchange to a 385 mL moderate classic implant on the left and a 425 mL moderate plus cohesive gel implant on the right.   She was seen recently with some persistent asymmetry and that the right breast still appeared smaller than the left breast.  The left breast also had a greater degree of projection and soft tissue fullness in the subareolar plane. She was interested in increasing the volume on the right to 475 or 500 mL combined with the periareolar or donut mastopexy on the left to address the projection. She comes in today for these two combined elective procedures. OPERATIVE PROCEDURE:  After appropriate consent was obtained, preoperative markings were placed. She was taken to the operating room and placed on the operating table in supine position. Satisfactory general endotracheal anesthesia was obtained. SCD devices were placed per routine. Our local anesthesia solution mixture was then injected around each breast based on our preoperative markings and her chest was sterilely prepped and draped. We began on the patient's right side making an inferior periareolar incision with a #10 scalpel blade in the existing scar. The electrocautery was then used to dissect through the subcutaneous tissues to the implant capsule. The old 425 mL implant on the left was removed. Some additional radial relaxing incisions and release of intraoperative capsular scar was performed under direct lighted visualization with electrocautery. The pocket was then irrigated with bacitracin irrigation. At this point, a 475 mL moderate plus profile sizer was placed in the pocket and the skin was stapled based on our markings. The patient was sat upright to inspect the symmetry based on this new volume which was felt to be very close. She was then placed back in the supine position and the sizer was removed. Additional irrigation with bacitracin was performed and then a [place] [employer], reference #[id number]. smooth, round, moderate plus profile 475 mL cohesive gel implant was placed in that right pocket, that implant had a lot #[id number].   The capsular defect was then reapproximated with 3-0 Vicryl sutures. The dermis and deeper breast tissue were also reapproximated with 3-0 Vicryl sutures. The superficial skin was then reapproximated with a 4-0 Monocryl in a running subcuticular fashion. A small crest of the skin was also excised at the 12 o'clock position to move that skin margin a few millimeters higher and that was also performed with a #10 scalpel blade and the defect was closed with 3 interrupted buried 3-0 Prolene sutures, interspersed with 3-0 Vicryl sutures, and a 4-0 Monocryl. We then turned our attention to the left breast.  Based on our preoperative markings, the nipple was reduced to approximately 50 mm based on the symmetry and size of the right side with a #10 scalpel blade. A small 1.25 cm crest of the skin was then excised outside of this. There was a small incision made in the inferolateral aspect of the capsule to deal with the contour defect which was old existing capsular tissues and fullness on the inferolateral aspect. This was removed with electrocautery. This pocket was then also irrigated with bacitracin and the capsular defect was reapproximated with 3-0 Vicryl sutures. The dermal tissues were also reapproximated with 3-0 Vicryl sutures. The periareolar or donut mastopexy was then performed with a 3-0 Prolene on an SH needle and a periareolar pinwheel pursestring suture. The superficial skin was then reapproximated with 4-0 Monocryl. Sterile dressings of Xeroform gauze, 4 x 4 gauze, and a surgical bra with a wide implant strap were placed. At the end of the bilateral procedures, sponge and needle counts were reported as correct. She was awakened, extubated, and transferred to the recovery room in satisfactory and stable condition. She will be discharged home today in care of her family with prescriptions and instructions and will follow up with me within 2 weeks.       MD CHET [email or web address]  D:  08/13/2019 13:37  T:  08/13/2019 13:44  JOB #:  [id number]  CC:  Ashley Smith MD

## 2020-02-20 ENCOUNTER — OP HISTORICAL/CONVERTED ENCOUNTER (OUTPATIENT)
Dept: OTHER | Age: 55
End: 2020-02-20

## 2020-03-30 ENCOUNTER — OP HISTORICAL/CONVERTED ENCOUNTER (OUTPATIENT)
Dept: OTHER | Age: 55
End: 2020-03-30

## 2021-05-10 ENCOUNTER — HOSPITAL ENCOUNTER (EMERGENCY)
Age: 56
Discharge: HOME | End: 2021-05-10
Attending: EMERGENCY MEDICINE
Payer: COMMERCIAL

## 2021-05-10 ENCOUNTER — APPOINTMENT (OUTPATIENT)
Dept: CT IMAGING | Age: 56
End: 2021-05-10
Attending: EMERGENCY MEDICINE
Payer: COMMERCIAL

## 2021-05-10 VITALS
HEART RATE: 91 BPM | BODY MASS INDEX: 23.19 KG/M2 | WEIGHT: 126.76 LBS | OXYGEN SATURATION: 94 % | TEMPERATURE: 97.8 F | SYSTOLIC BLOOD PRESSURE: 126 MMHG | DIASTOLIC BLOOD PRESSURE: 84 MMHG | RESPIRATION RATE: 16 BRPM

## 2021-05-10 DIAGNOSIS — R10.817 GENERALIZED ABDOMINAL TENDERNESS WITHOUT REBOUND TENDERNESS: Primary | ICD-10-CM

## 2021-05-10 LAB
ALBUMIN SERPL-MCNC: 4.3 G/DL (ref 3.5–5)
ALBUMIN/GLOB SERPL: 1.2 {RATIO} (ref 1.1–2.2)
ALP SERPL-CCNC: 61 U/L (ref 45–117)
ALT SERPL-CCNC: 29 U/L (ref 12–78)
ANION GAP SERPL CALC-SCNC: 12 MMOL/L (ref 5–15)
APPEARANCE UR: CLEAR
AST SERPL-CCNC: 23 U/L (ref 15–37)
BACTERIA URNS QL MICRO: NEGATIVE /HPF
BASOPHILS # BLD: 0 K/UL (ref 0–0.1)
BASOPHILS NFR BLD: 0 % (ref 0–1)
BILIRUB SERPL-MCNC: 0.7 MG/DL (ref 0.2–1)
BILIRUB UR QL: NEGATIVE
BUN SERPL-MCNC: 12 MG/DL (ref 6–20)
BUN/CREAT SERPL: 15 (ref 12–20)
CALCIUM SERPL-MCNC: 9.2 MG/DL (ref 8.5–10.1)
CHLORIDE SERPL-SCNC: 101 MMOL/L (ref 97–108)
CO2 SERPL-SCNC: 27 MMOL/L (ref 21–32)
COLOR UR: ABNORMAL
COMMENT, HOLDF: NORMAL
CREAT SERPL-MCNC: 0.8 MG/DL (ref 0.55–1.02)
DIFFERENTIAL METHOD BLD: NORMAL
EOSINOPHIL # BLD: 0.1 K/UL (ref 0–0.4)
EOSINOPHIL NFR BLD: 1 % (ref 0–7)
EPITH CASTS URNS QL MICRO: ABNORMAL /LPF
ERYTHROCYTE [DISTWIDTH] IN BLOOD BY AUTOMATED COUNT: 12.2 % (ref 11.5–14.5)
GLOBULIN SER CALC-MCNC: 3.5 G/DL (ref 2–4)
GLUCOSE SERPL-MCNC: 112 MG/DL (ref 65–100)
GLUCOSE UR STRIP.AUTO-MCNC: NEGATIVE MG/DL
HCT VFR BLD AUTO: 43 % (ref 35–47)
HGB BLD-MCNC: 14 G/DL (ref 11.5–16)
HGB UR QL STRIP: ABNORMAL
IMM GRANULOCYTES # BLD AUTO: 0 K/UL (ref 0–0.04)
IMM GRANULOCYTES NFR BLD AUTO: 0 % (ref 0–0.5)
KETONES UR QL STRIP.AUTO: NEGATIVE MG/DL
LEUKOCYTE ESTERASE UR QL STRIP.AUTO: NEGATIVE
LIPASE SERPL-CCNC: 109 U/L (ref 73–393)
LYMPHOCYTES # BLD: 1.5 K/UL (ref 0.8–3.5)
LYMPHOCYTES NFR BLD: 16 % (ref 12–49)
MCH RBC QN AUTO: 31.4 PG (ref 26–34)
MCHC RBC AUTO-ENTMCNC: 32.6 G/DL (ref 30–36.5)
MCV RBC AUTO: 96.4 FL (ref 80–99)
MONOCYTES # BLD: 0.8 K/UL (ref 0–1)
MONOCYTES NFR BLD: 8 % (ref 5–13)
MUCOUS THREADS URNS QL MICRO: ABNORMAL /LPF
NEUTS SEG # BLD: 7 K/UL (ref 1.8–8)
NEUTS SEG NFR BLD: 75 % (ref 32–75)
NITRITE UR QL STRIP.AUTO: NEGATIVE
NRBC # BLD: 0 K/UL (ref 0–0.01)
NRBC BLD-RTO: 0 PER 100 WBC
PH UR STRIP: 5.5 [PH] (ref 5–8)
PLATELET # BLD AUTO: 348 K/UL (ref 150–400)
PMV BLD AUTO: 8.9 FL (ref 8.9–12.9)
POTASSIUM SERPL-SCNC: 3.9 MMOL/L (ref 3.5–5.1)
PROT SERPL-MCNC: 7.8 G/DL (ref 6.4–8.2)
PROT UR STRIP-MCNC: NEGATIVE MG/DL
RBC # BLD AUTO: 4.46 M/UL (ref 3.8–5.2)
RBC #/AREA URNS HPF: ABNORMAL /HPF (ref 0–5)
SAMPLES BEING HELD,HOLD: NORMAL
SODIUM SERPL-SCNC: 140 MMOL/L (ref 136–145)
SP GR UR REFRACTOMETRY: 1.02 (ref 1–1.03)
UROBILINOGEN UR QL STRIP.AUTO: 0.2 EU/DL (ref 0.2–1)
WBC # BLD AUTO: 9.4 K/UL (ref 3.6–11)
WBC URNS QL MICRO: ABNORMAL /HPF (ref 0–4)

## 2021-05-10 PROCEDURE — 74011000636 HC RX REV CODE- 636: Performed by: EMERGENCY MEDICINE

## 2021-05-10 PROCEDURE — 96374 THER/PROPH/DIAG INJ IV PUSH: CPT

## 2021-05-10 PROCEDURE — 85025 COMPLETE CBC W/AUTO DIFF WBC: CPT

## 2021-05-10 PROCEDURE — 99284 EMERGENCY DEPT VISIT MOD MDM: CPT

## 2021-05-10 PROCEDURE — 74011250636 HC RX REV CODE- 250/636: Performed by: EMERGENCY MEDICINE

## 2021-05-10 PROCEDURE — 74177 CT ABD & PELVIS W/CONTRAST: CPT

## 2021-05-10 PROCEDURE — 81001 URINALYSIS AUTO W/SCOPE: CPT

## 2021-05-10 PROCEDURE — 80053 COMPREHEN METABOLIC PANEL: CPT

## 2021-05-10 PROCEDURE — 83690 ASSAY OF LIPASE: CPT

## 2021-05-10 RX ORDER — CYCLOBENZAPRINE HCL 5 MG
5 TABLET ORAL AS NEEDED
COMMUNITY

## 2021-05-10 RX ORDER — KETOROLAC TROMETHAMINE 30 MG/ML
15 INJECTION, SOLUTION INTRAMUSCULAR; INTRAVENOUS
Status: COMPLETED | OUTPATIENT
Start: 2021-05-10 | End: 2021-05-10

## 2021-05-10 RX ORDER — TRAZODONE HYDROCHLORIDE 50 MG/1
50 TABLET ORAL
COMMUNITY
End: 2021-09-08

## 2021-05-10 RX ADMIN — IOPAMIDOL 100 ML: 755 INJECTION, SOLUTION INTRAVENOUS at 13:39

## 2021-05-10 RX ADMIN — SODIUM CHLORIDE 1000 ML: 9 INJECTION, SOLUTION INTRAVENOUS at 13:19

## 2021-05-10 RX ADMIN — KETOROLAC TROMETHAMINE 15 MG: 30 INJECTION, SOLUTION INTRAMUSCULAR; INTRAVENOUS at 13:19

## 2021-05-10 NOTE — DISCHARGE INSTRUCTIONS
Thank you for allowing us to provide you with medical care today. We realize that you have many choices for your emergency care needs. We thank you for choosing [facility]. Please choose us in the future for any continued health care needs. We hope we addressed all of your medical concerns. We strive to provide excellent quality care in the Emergency Department. Anything less than excellent does not meet our expectations. The exam and treatment you received in the Emergency Department were for an emergent problem and are not intended as complete care. It is important that you follow up with a doctor, nurse practitioner, or physician's assistant for ongoing care. If your symptoms worsen or you do not improve as expected and you are unable to reach your usual health care provider, you should return to the Emergency Department. We are available 24 hours a day. Take this sheet with you when you go to your follow-up visit. If you have any problem arranging the follow-up visit, contact the Emergency Department immediately. Make an appointment your family doctor for follow up of this visit. Return to the ER if you are unable to be seen in a timely manner.

## 2021-05-10 NOTE — ED PROVIDER NOTES
Please note that this dictation was completed with [employer], the computer voice recognition software.  Quite often unanticipated grammatical, syntax, homophones, and other interpretive errors are inadvertently transcribed by the computer software.  Please disregard these errors.  Please excuse any errors that have escaped final proofreading. 77-year-old female past medical history markable for migraines, nausea and vomiting, and thyroid disease presents the ER complaining of \" left flank pain which has been moving around. Today it started in the left flank into my right flank. It is also throughout the floor and is covered all parts of my abdomen sometimes the side of my left and right lower legs even the bottoms of my feet. \"  I see my PCP I seen patient first they tell me to take trazodone and a muscle relaxer which does not seem to help but actually seems to make it worse. I have been eating and drinking okay going to the bathroom okay. There is been no trauma. Patient states she is never had this pain before. Patient states is a constant achy squeezing type pain sometimes \"feels like there is a belt around the middle of my waist.\"  The pain is unchanged with food unchanged with bladder bowel habits. Patient does note sometimes it seems to get worse at night when I lay down to go to sleep and maybe that is because I start thinking about it focusing on it. \"  Patient drove herself to the ER today for further evaluation.     pt denies HA, vison changes, diff swallowing, CP, SOB,  F/Ch, N/V, D/Cons or other current systemic complaints    Social/ PSH reviewed in EMR    EMR Chart Reviewed           Past Medical History:   Diagnosis Date    Adverse effect of anesthesia     MEDICATION  FROM PREVIOUS SURGERY IN 01/2019 GAVE PATIENT A MIGRANE- HAD TO COME TO ED FORM PAIN AND VOMITING    Migraines     Nausea & vomiting     Thyroid disease        Past Surgical History:   Procedure Laterality Date    ABDOMEN SURGERY PROC UNLISTED  2004    abdominoplasty    HX BREAST AUGMENTATION  1994    bilaterql breast augmentation    HX BREAST AUGMENTATION  2007    revision    HX BREAST AUGMENTATION Bilateral 1/22/2019    BILATERAL BREAST IMPLANT REMOVAL AND REPLACEMENT performed by Jean Wiley MD at [address] HX BREAST REDUCTION Bilateral 8/13/2019    UNILATERAL (LEFT) DONUT BREAST MASTOPEXY, UNILATERAL (RIGHT) BREAST IMPLANT REMOVAL AND EXCHANGE performed by Jean Wiley MD at [address] HX COLONOSCOPY      HX GI      colonoscopy    HX GYN  2011    hysteroscopy    HX HEENT  2001 and 2013    septoplasty  x2    HX HEENT      WISDOM TEETH X4    HX HYSTERECTOMY  04/2015    HX ORTHOPAEDIC Right 2000     hand ganglion cystectomy    HX OTHER SURGICAL      facelift, bilaterald blepharoplasty         Family History:   Problem Relation Age of Onset    Hypertension Mother     Cancer Father         prostate/ non hodgkins lymphoma(remission), MULTIPLE MYELOMA    Thyroid Disease Sister     Anesth Problems Sister         POST OP NAUSEA    No Known Problems Brother        Social History     Socioeconomic History    Marital status: SINGLE     Spouse name: Not on file    Number of children: Not on file    Years of education: Not on file    Highest education level: Not on file   Occupational History    Not on file   Social Needs    Financial resource strain: Not on file    Food insecurity     Worry: Not on file     Inability: Not on file    Transportation needs     Medical: Not on file     Non-medical: Not on file   Tobacco Use    Smoking status: Never Smoker    Smokeless tobacco: Never Used   Substance and Sexual Activity    Alcohol use:  Yes     Alcohol/week: 5.0 standard drinks     Types: 5 Glasses of wine per week    Drug use: No    Sexual activity: Not on file   Lifestyle    Physical activity     Days per week: Not on file     Minutes per session: Not on file    Stress: Not on file Relationships    Social connections     Talks on phone: Not on file     Gets together: Not on file     Attends Quaker service: Not on file     Active member of club or organization: Not on file     Attends meetings of clubs or organizations: Not on file     Relationship status: Not on file    Intimate partner violence     Fear of current or ex partner: Not on file     Emotionally abused: Not on file     Physically abused: Not on file     Forced sexual activity: Not on file   Other Topics Concern    Not on file   Social History Narrative    Not on file         ALLERGIES: Compazine [prochlorperazine edisylate], Darvocet a500 [propoxyphene n-acetaminophen], Percocet [oxycodone-acetaminophen], and Prednisone    Review of Systems   Constitutional: Negative for appetite change, chills, fatigue and fever. HENT: Negative for trouble swallowing and voice change. Eyes: Negative for photophobia and visual disturbance. Respiratory: Negative for chest tightness and shortness of breath. Cardiovascular: Negative for chest pain, palpitations and leg swelling. Gastrointestinal: Positive for abdominal pain. Negative for diarrhea, nausea and vomiting. Genitourinary: Positive for flank pain. Negative for dysuria, vaginal bleeding and vaginal discharge. Musculoskeletal: Negative for back pain. Neurological: Negative for facial asymmetry and speech difficulty. Psychiatric/Behavioral: Negative for confusion. All other systems reviewed and are negative. Vitals:    05/10/21 1250 05/10/21 1342 05/10/21 1400 05/10/21 1430   BP: 130/89 (!) 142/89 135/79 126/84   Pulse: 88 89 85 91   Resp: 16 16 16 16   Temp: 97.9 °F (36.6 °C)   97.8 °F (36.6 °C)   SpO2: 96% 99% 100% 94%   Weight: 57.5 kg (126 lb 12.2 oz)               Physical Exam  Vitals signs and nursing note reviewed. Constitutional:       General: She is not in acute distress. Appearance: Normal appearance. She is well-developed.  She is not ill-appearing, toxic-appearing or diaphoretic. Comments: NAD, AxOx4, speaking in complete sentences       HENT:      Head: Normocephalic and atraumatic. Right Ear: External ear normal.      Left Ear: External ear normal.   Eyes:      General: No scleral icterus. Right eye: No discharge. Left eye: No discharge. Extraocular Movements: Extraocular movements intact. Conjunctiva/sclera: Conjunctivae normal.      Pupils: Pupils are equal, round, and reactive to light. Neck:      Musculoskeletal: Normal range of motion and neck supple. No neck rigidity or muscular tenderness. Vascular: No JVD. Trachea: No tracheal deviation. Cardiovascular:      Rate and Rhythm: Normal rate and regular rhythm. Pulses: Normal pulses. Heart sounds: Normal heart sounds. No murmur. No friction rub. No gallop. Pulmonary:      Effort: Pulmonary effort is normal. No respiratory distress. Breath sounds: Normal breath sounds. No stridor. No wheezing, rhonchi or rales. Chest:      Chest wall: No tenderness. Abdominal:      General: Bowel sounds are normal.      Palpations: Abdomen is soft. Tenderness: There is no abdominal tenderness. There is no guarding or rebound. Hernia: No hernia is present. Genitourinary:     Vagina: No vaginal discharge. Comments: Pt denies urinary/ vaginal complaints  Musculoskeletal: Normal range of motion. General: No swelling, tenderness, deformity or signs of injury. Right lower leg: No edema. Left lower leg: No edema. Skin:     General: Skin is warm and dry. Capillary Refill: Capillary refill takes less than 2 seconds. Coloration: Skin is not jaundiced or pale. Findings: No bruising, erythema, lesion or rash. Neurological:      General: No focal deficit present. Mental Status: She is alert and oriented to person, place, and time. Cranial Nerves: No cranial nerve deficit.       Sensory: No sensory deficit. Motor: No weakness or abnormal muscle tone. Coordination: Coordination normal.      Gait: Gait normal.      Deep Tendon Reflexes: Reflexes normal.   Psychiatric:         Behavior: Behavior normal.         Thought Content: Thought content normal.          MDM       Procedures    2:32 PM 'feeling better now'; pain journal/ PCP follow-up; 'maybe I will see my spine Dr also';    Khoa Damon  results have been reviewed with her. She has been counseled regarding her diagnosis. She verbally conveys understanding and agreement of the signs, symptoms, diagnosis, treatment and prognosis and additionally agrees to Call/ Arrange follow up as recommended with Dr. Aiden Ervin MD in 24 - 48 hours. She also agrees with the care-plan and conveys that all of her questions have been answered. I have also put together some discharge instructions for her that include: 1) educational information regarding their diagnosis, 2) how to care for their diagnosis at home, as well a 3) list of reasons why they would want to return to the ED prior to their follow-up appointment, should their condition change or for concerns.

## 2021-05-10 NOTE — ED TRIAGE NOTES
Triage Note: Patient complains of left rib/abdominal pain for 5 to 6 weeks. Denies N/V. Denies urinary symptoms. Patient has seen several doctors who believe is musculoskeletal related. Hx of a pinched nerve in her back.

## 2021-09-08 ENCOUNTER — HOSPITAL ENCOUNTER (OUTPATIENT)
Dept: PREADMISSION TESTING | Age: 56
Discharge: HOME | End: 2021-09-08
Payer: SELF-PAY

## 2021-09-08 VITALS
BODY MASS INDEX: 22.14 KG/M2 | DIASTOLIC BLOOD PRESSURE: 72 MMHG | TEMPERATURE: 97.4 F | WEIGHT: 117.28 LBS | HEART RATE: 89 BPM | HEIGHT: 61 IN | SYSTOLIC BLOOD PRESSURE: 124 MMHG

## 2021-09-08 LAB
ANION GAP SERPL CALC-SCNC: 4 MMOL/L (ref 5–15)
ATRIAL RATE: 74 BPM
BUN SERPL-MCNC: 15 MG/DL (ref 6–20)
BUN/CREAT SERPL: 19 (ref 12–20)
CALCIUM SERPL-MCNC: 8.8 MG/DL (ref 8.5–10.1)
CALCULATED P AXIS, ECG09: 49 DEGREES
CALCULATED R AXIS, ECG10: 34 DEGREES
CALCULATED T AXIS, ECG11: 36 DEGREES
CHLORIDE SERPL-SCNC: 105 MMOL/L (ref 97–108)
CO2 SERPL-SCNC: 27 MMOL/L (ref 21–32)
CREAT SERPL-MCNC: 0.8 MG/DL (ref 0.55–1.02)
DIAGNOSIS, 93000: NORMAL
ERYTHROCYTE [DISTWIDTH] IN BLOOD BY AUTOMATED COUNT: 13.1 % (ref 11.5–14.5)
GLUCOSE SERPL-MCNC: 88 MG/DL (ref 65–100)
HCT VFR BLD AUTO: 41.9 % (ref 35–47)
HGB BLD-MCNC: 14 G/DL (ref 11.5–16)
MCH RBC QN AUTO: 31.3 PG (ref 26–34)
MCHC RBC AUTO-ENTMCNC: 33.4 G/DL (ref 30–36.5)
MCV RBC AUTO: 93.7 FL (ref 80–99)
NRBC # BLD: 0 K/UL (ref 0–0.01)
NRBC BLD-RTO: 0 PER 100 WBC
P-R INTERVAL, ECG05: 130 MS
PLATELET # BLD AUTO: 331 K/UL (ref 150–400)
PMV BLD AUTO: 9.3 FL (ref 8.9–12.9)
POTASSIUM SERPL-SCNC: 4.7 MMOL/L (ref 3.5–5.1)
Q-T INTERVAL, ECG07: 446 MS
QRS DURATION, ECG06: 90 MS
QTC CALCULATION (BEZET), ECG08: 495 MS
RBC # BLD AUTO: 4.47 M/UL (ref 3.8–5.2)
SODIUM SERPL-SCNC: 136 MMOL/L (ref 136–145)
VENTRICULAR RATE, ECG03: 74 BPM
WBC # BLD AUTO: 4.3 K/UL (ref 3.6–11)

## 2021-09-08 PROCEDURE — 80048 BASIC METABOLIC PNL TOTAL CA: CPT

## 2021-09-08 PROCEDURE — 93005 ELECTROCARDIOGRAM TRACING: CPT

## 2021-09-08 PROCEDURE — 36415 COLL VENOUS BLD VENIPUNCTURE: CPT

## 2021-09-08 PROCEDURE — 85027 COMPLETE CBC AUTOMATED: CPT

## 2021-09-08 RX ORDER — CLONAZEPAM 1 MG/1
TABLET ORAL
COMMUNITY

## 2021-09-08 RX ORDER — FLUOXETINE HYDROCHLORIDE 40 MG/1
CAPSULE ORAL
COMMUNITY

## 2021-09-08 RX ORDER — ACETAMINOPHEN 500 MG
TABLET ORAL DAILY
COMMUNITY

## 2021-09-08 RX ORDER — AMLODIPINE BESYLATE 2.5 MG/1
5 TABLET ORAL
COMMUNITY

## 2021-10-11 ENCOUNTER — TRANSCRIBE ORDER (OUTPATIENT)
Dept: REGISTRATION | Age: 56
End: 2021-10-11

## 2021-10-11 DIAGNOSIS — Z01.812 PRE-PROCEDURE LAB EXAM: Primary | ICD-10-CM

## 2021-10-26 ENCOUNTER — HOSPITAL ENCOUNTER (OUTPATIENT)
Dept: PREADMISSION TESTING | Age: 56
Discharge: HOME | End: 2021-10-26
Payer: SELF-PAY

## 2021-10-26 DIAGNOSIS — Z01.812 PRE-PROCEDURE LAB EXAM: ICD-10-CM

## 2021-10-26 PROCEDURE — U0005 INFEC AGEN DETEC AMPLI PROBE: HCPCS

## 2021-10-27 LAB
SARS-COV-2, XPLCVT: NOT DETECTED
SOURCE, COVRS: NORMAL

## 2021-10-28 NOTE — H&P
[facility] [employer]  : 1965  DOS: 10/22/2021    Chief Complaint: consultation       Allergies: Zofran , percocet , Compazine       Medications: clonazePAM , FLUoxetine , Advil 200 mg oral tablet , Synthroid       Medical History: Height: 5' 2\", Weight: 120lbs    Pulmonary System: Negative  Cardiac System: Negative  Blood and Liver Systems: Negative  Neurologic and Endocrine Systems: thyroid trouble  GI,  and Reproductive Systems: Negative  Cancer: Negative   Surgical History: partial hysterectomy (ovaries retained) ,  Septoplasty Revision ,  Breast Augmentation Revision ,  Abdominoplasty ,  Breast Implant Revision ,  Septoplasty ,   Breast Augmentation       Family History: Heart Disease Father , High Blood Pressure Mother , Cancer Father       Social History: Smoking Status Never smoker    Alcohol Use Drinks per week: 4-5     Pregnancy Number of pregnancies:  1    Number of children:0          Ms. Steff Mccrary is a pleasant 22-year-old female who is over 1 year out from an implant exchange and capsulectomy, and additional elective breast procedures to improve balance and symmetry. She was last seen in December of last year and returns today for a follow-up exam with an interest now in additional surgery. I first operated on her in 2019 where she underwent capsulorrhaphy and implant exchange to a 385-cc implant on the left and a 425-cc implant on the right to deal with volume asymmetry and malposition and scarring. In 2020, she underwent some additional work including pee areolar skin tightening and nipple areolar complex repositioning and exchanging the 425-cc implant on the right to a 475-cc implant secondary to her perceived persistent volume asymmetry.   She now returns today stating that she wants to remove both implants and replace with a smaller device, as she feels as though she is too large from a breast standpoint and is interested in going smaller and to address the skin envelope with a mastopexy. Review of systems reveals normal heart and lung sounds. Examination demonstrates well-healed scars around the pee areolar location and obvious breast implants that have a soft Bakers grade 1/2 capsular feel. She does not demonstrate any ptosis or pseudoptosis. She does have a little flattened area and scar contracture below the nipple areolar complex that has been present since we first met that I believe is improved but still obvious. I had a lengthy discussion with her regarding implant volume exchange and in most cases differences of less than  cc volume are negligible and not appreciated by the patient. She currently has a 475-cc implant on the right and a 385-cc implant on the left. I would reduce both implants at least 100 cc going down to a 350-375 cc volume and performing a mastopexy to ensure all breast tissue sits above the fold with the nipple and the most anterior projection. This is performed under a general anesthetic on an outpatient basis. I diagrammed on her breasts where incisions are made to access her existing pocket and remove the old implant, place the new implant of her choosing in the existing pocket and making any adjustments in the pocket size and position to ensure the implant sits appropriately, and additional incisions are made on the breast skin to perform the mastopexy. All incisions are closed in layers with dissolvable sutures and a compression garment is used with associated activity restrictions while she is healing which can extend beyond 6 weeks. There is no exercise for 4 weeks.  The risks and complications include but are not limited to infection, pain, bleeding, hematomas requiring re-operation, skin sensitivity changes, vascular compromise to tissues resulting in partial or complete loss of tissues, resultant open wounds, the need for long term wound care and dressing changes and scarring, irregularities and asymmetries requiring touch-up and revision surgery, an unacceptable cosmetic result, and other things including cardiac and pulmonary risks that include death. Additional implant specific risks include capsular contracture, scalloping or rippling, implant malposition, implant infection, and implant failure. We also discussed breast implant illness and RAVEN-ALCL. Her questions were answered, and pictures were taken. She will meet with Katy to discuss the fees for an implant exchange in more than likely at least a vertical mastopexy. I had a phone consultation with Ms. Leonardondreji Turpin today regarding her last office visit in June and her interest in moving forward with some revision surgery. She has developed a capsular contracture on the right that is affecting the implant position and the appearance of the right breast.  In addition, she reports persistent asymmetry with the left breast looking different and larger than the right. She has a 475-cc implant on the right and a 385-cc implant on the left and has had pee areolar or doughnut mastopexy. Addendum: On review of her pictures, she still demonstrates more volume and laxity on the left breast even though the implant is almost 100 cc smaller. She also is beginning to develop a transverse crease below the right nipple areolar complex that was not there in March, that I believe is developing secondary to the contracture. She stated her goals were to possibly avoid changing her breast implants and the added cost, deal with the capsular contracture on the right with capsulectomy, and repeat skin tightening or mastopexy procedures. On her left I think she will require something other than a pee areolar approach as the greater degree of laxity and soft tissue I think can be addressed with at least a vertical mastopexy. Her existing implants will then be placed back in the pocket, and any breast incision closed in layers with dissolvable sutures.   A compression garment is used with associated activity restrictions while she is healing which can extend beyond 6 weeks. The risks and complications include but are not limited to infection, pain, bleeding, hematomas requiring re-operation, skin sensitivity changes, vascular compromise to tissues resulting in partial or complete loss of tissues, resultant open wounds, the need for long term wound care and dressing changes and scarring, irregularities and asymmetries requiring touch-up and revision surgery, an unacceptable cosmetic result, and other things including cardiac and pulmonary risks that include death. Additional implant risks include capsule contracture, scalloping or rippling, implant malposition, implant failure, and implant infection. She also understands the persistent risks of breast implant illness and RAVEN-ALCL  The patient was counseled about the risks of gopi Covid-19 during their perioperative period and any recovery window from their procedure. The patient was made aware that gopi Covid-19 may worsen their prognosis for recovering from their procedure and lend to a higher morbidity and/or mortality risk. All material risks, benefits, and reasonable alternatives including postponing the procedure were discussed. The patient DOES wish to proceed with their procedure at this time. Jolie Da Silva M.D.  FACS

## 2021-10-29 ENCOUNTER — APPOINTMENT (OUTPATIENT)
Dept: CT IMAGING | Age: 56
End: 2021-10-29
Attending: EMERGENCY MEDICINE
Payer: COMMERCIAL

## 2021-10-29 ENCOUNTER — ANESTHESIA EVENT (OUTPATIENT)
Dept: MEDSURG UNIT | Age: 56
End: 2021-10-29
Payer: SELF-PAY

## 2021-10-29 ENCOUNTER — HOSPITAL ENCOUNTER (EMERGENCY)
Age: 56
Discharge: HOME | End: 2021-10-29
Attending: EMERGENCY MEDICINE | Admitting: EMERGENCY MEDICINE
Payer: COMMERCIAL

## 2021-10-29 ENCOUNTER — HOSPITAL ENCOUNTER (OUTPATIENT)
Age: 56
Setting detail: OUTPATIENT SURGERY
Discharge: HOME | End: 2021-10-29
Attending: SURGERY | Admitting: SURGERY
Payer: SELF-PAY

## 2021-10-29 ENCOUNTER — ANESTHESIA (OUTPATIENT)
Dept: MEDSURG UNIT | Age: 56
End: 2021-10-29
Payer: SELF-PAY

## 2021-10-29 VITALS
BODY MASS INDEX: 21.92 KG/M2 | DIASTOLIC BLOOD PRESSURE: 72 MMHG | SYSTOLIC BLOOD PRESSURE: 101 MMHG | HEART RATE: 85 BPM | WEIGHT: 116 LBS | OXYGEN SATURATION: 88 % | TEMPERATURE: 97.6 F | RESPIRATION RATE: 20 BRPM

## 2021-10-29 VITALS
RESPIRATION RATE: 16 BRPM | HEART RATE: 115 BPM | SYSTOLIC BLOOD PRESSURE: 146 MMHG | DIASTOLIC BLOOD PRESSURE: 80 MMHG | TEMPERATURE: 98.2 F | OXYGEN SATURATION: 98 %

## 2021-10-29 DIAGNOSIS — L03.211 FACIAL CELLULITIS: ICD-10-CM

## 2021-10-29 DIAGNOSIS — K04.7 DENTAL ABSCESS: Primary | ICD-10-CM

## 2021-10-29 LAB
ALBUMIN SERPL-MCNC: 4.3 G/DL (ref 3.5–5)
ALBUMIN/GLOB SERPL: 0.9 {RATIO} (ref 1.1–2.2)
ALP SERPL-CCNC: 85 U/L (ref 45–117)
ALT SERPL-CCNC: 23 U/L (ref 12–78)
ANION GAP SERPL CALC-SCNC: 8 MMOL/L (ref 5–15)
AST SERPL-CCNC: 12 U/L (ref 15–37)
BASOPHILS # BLD: 0 K/UL (ref 0–0.1)
BASOPHILS NFR BLD: 0 % (ref 0–1)
BILIRUB SERPL-MCNC: 0.7 MG/DL (ref 0.2–1)
BUN SERPL-MCNC: 12 MG/DL (ref 6–20)
BUN/CREAT SERPL: 16 (ref 12–20)
CALCIUM SERPL-MCNC: 9.8 MG/DL (ref 8.5–10.1)
CHLORIDE SERPL-SCNC: 102 MMOL/L (ref 97–108)
CO2 SERPL-SCNC: 26 MMOL/L (ref 21–32)
COMMENT, HOLDF: NORMAL
CREAT SERPL-MCNC: 0.74 MG/DL (ref 0.55–1.02)
DIFFERENTIAL METHOD BLD: ABNORMAL
EOSINOPHIL # BLD: 0 K/UL (ref 0–0.4)
EOSINOPHIL NFR BLD: 0 % (ref 0–7)
ERYTHROCYTE [DISTWIDTH] IN BLOOD BY AUTOMATED COUNT: 13.3 % (ref 11.5–14.5)
GLOBULIN SER CALC-MCNC: 4.6 G/DL (ref 2–4)
GLUCOSE SERPL-MCNC: 89 MG/DL (ref 65–100)
HCT VFR BLD AUTO: 42.3 % (ref 35–47)
HGB BLD-MCNC: 14.3 G/DL (ref 11.5–16)
IMM GRANULOCYTES # BLD AUTO: 0 K/UL (ref 0–0.04)
IMM GRANULOCYTES NFR BLD AUTO: 0 % (ref 0–0.5)
LYMPHOCYTES # BLD: 1.4 K/UL (ref 0.8–3.5)
LYMPHOCYTES NFR BLD: 13 % (ref 12–49)
MCH RBC QN AUTO: 29.6 PG (ref 26–34)
MCHC RBC AUTO-ENTMCNC: 33.8 G/DL (ref 30–36.5)
MCV RBC AUTO: 87.6 FL (ref 80–99)
MONOCYTES # BLD: 1.1 K/UL (ref 0–1)
MONOCYTES NFR BLD: 10 % (ref 5–13)
NEUTS SEG # BLD: 8.1 K/UL (ref 1.8–8)
NEUTS SEG NFR BLD: 77 % (ref 32–75)
NRBC # BLD: 0 K/UL (ref 0–0.01)
NRBC BLD-RTO: 0 PER 100 WBC
PLATELET # BLD AUTO: 367 K/UL (ref 150–400)
PMV BLD AUTO: 8.6 FL (ref 8.9–12.9)
POTASSIUM SERPL-SCNC: 3.6 MMOL/L (ref 3.5–5.1)
PROT SERPL-MCNC: 8.9 G/DL (ref 6.4–8.2)
RBC # BLD AUTO: 4.83 M/UL (ref 3.8–5.2)
SAMPLES BEING HELD,HOLD: NORMAL
SODIUM SERPL-SCNC: 136 MMOL/L (ref 136–145)
WBC # BLD AUTO: 10.7 K/UL (ref 3.6–11)

## 2021-10-29 PROCEDURE — 74011250636 HC RX REV CODE- 250/636: Performed by: SURGERY

## 2021-10-29 PROCEDURE — 76060000065 HC AMB SURG ANES 2.5 TO 3 HR: Performed by: SURGERY

## 2021-10-29 PROCEDURE — 76210000037 HC AMBSU PH I REC 2 TO 2.5 HR: Performed by: SURGERY

## 2021-10-29 PROCEDURE — 77030008462 HC STPLR SKN PROX J&J -A: Performed by: SURGERY

## 2021-10-29 PROCEDURE — 76030000005 HC AMB SURG OR TIME 2.5 TO 3: Performed by: SURGERY

## 2021-10-29 PROCEDURE — 77030026227 HC FUNL KELLR 2 PCH ALGN -C: Performed by: SURGERY

## 2021-10-29 PROCEDURE — 74011250637 HC RX REV CODE- 250/637: Performed by: EMERGENCY MEDICINE

## 2021-10-29 PROCEDURE — 77030038692 HC WND DEB SYS IRMX -B: Performed by: SURGERY

## 2021-10-29 PROCEDURE — 74011000636 HC RX REV CODE- 636: Performed by: INTERNAL MEDICINE

## 2021-10-29 PROCEDURE — 77030040830 HC CATH URETH FOL MDII -A: Performed by: SURGERY

## 2021-10-29 PROCEDURE — 74011250637 HC RX REV CODE- 250/637: Performed by: ANESTHESIOLOGY

## 2021-10-29 PROCEDURE — 77030002986 HC SUT PROL J&J -A: Performed by: SURGERY

## 2021-10-29 PROCEDURE — 99282 EMERGENCY DEPT VISIT SF MDM: CPT

## 2021-10-29 PROCEDURE — 74011000250 HC RX REV CODE- 250: Performed by: REGISTERED NURSE

## 2021-10-29 PROCEDURE — 2709999900 HC NON-CHARGEABLE SUPPLY

## 2021-10-29 PROCEDURE — 77030008684 HC TU ET CUF COVD -B: Performed by: ANESTHESIOLOGY

## 2021-10-29 PROCEDURE — 36415 COLL VENOUS BLD VENIPUNCTURE: CPT

## 2021-10-29 PROCEDURE — 74011000250 HC RX REV CODE- 250: Performed by: SURGERY

## 2021-10-29 PROCEDURE — 70487 CT MAXILLOFACIAL W/DYE: CPT

## 2021-10-29 PROCEDURE — 77030031139 HC SUT VCRL2 J&J -A: Performed by: SURGERY

## 2021-10-29 PROCEDURE — 77030002933 HC SUT MCRYL J&J -A: Performed by: SURGERY

## 2021-10-29 PROCEDURE — 88305 TISSUE EXAM BY PATHOLOGIST: CPT

## 2021-10-29 PROCEDURE — 74011250636 HC RX REV CODE- 250/636: Performed by: REGISTERED NURSE

## 2021-10-29 PROCEDURE — 74011250636 HC RX REV CODE- 250/636: Performed by: ANESTHESIOLOGY

## 2021-10-29 PROCEDURE — 77030002966 HC SUT PDS J&J -A: Performed by: SURGERY

## 2021-10-29 PROCEDURE — 77030040361 HC SLV COMPR DVT MDII -B: Performed by: SURGERY

## 2021-10-29 PROCEDURE — 77030041680 HC PNCL ELECSURG SMK EVAC CNMD -B: Performed by: SURGERY

## 2021-10-29 PROCEDURE — 77030026438 HC STYL ET INTUB CARD -A: Performed by: ANESTHESIOLOGY

## 2021-10-29 PROCEDURE — 80053 COMPREHEN METABOLIC PANEL: CPT

## 2021-10-29 PROCEDURE — 77030011265 HC ELECTRD BLD HEX COVD -A: Performed by: SURGERY

## 2021-10-29 PROCEDURE — 2709999900 HC NON-CHARGEABLE SUPPLY: Performed by: SURGERY

## 2021-10-29 PROCEDURE — 74011250637 HC RX REV CODE- 250/637: Performed by: SURGERY

## 2021-10-29 PROCEDURE — 85025 COMPLETE CBC W/AUTO DIFF WBC: CPT

## 2021-10-29 RX ORDER — ONDANSETRON 2 MG/ML
4 INJECTION INTRAMUSCULAR; INTRAVENOUS AS NEEDED
Status: DISCONTINUED | OUTPATIENT
Start: 2021-10-29 | End: 2021-10-29 | Stop reason: HOSPADM

## 2021-10-29 RX ORDER — SODIUM CHLORIDE, SODIUM LACTATE, POTASSIUM CHLORIDE, CALCIUM CHLORIDE 600; 310; 30; 20 MG/100ML; MG/100ML; MG/100ML; MG/100ML
125 INJECTION, SOLUTION INTRAVENOUS CONTINUOUS
Status: DISCONTINUED | OUTPATIENT
Start: 2021-10-29 | End: 2021-10-29 | Stop reason: HOSPADM

## 2021-10-29 RX ORDER — SODIUM CHLORIDE 0.9 % (FLUSH) 0.9 %
5-40 SYRINGE (ML) INJECTION AS NEEDED
Status: DISCONTINUED | OUTPATIENT
Start: 2021-10-29 | End: 2021-10-29 | Stop reason: HOSPADM

## 2021-10-29 RX ORDER — AMOXICILLIN AND CLAVULANATE POTASSIUM 875; 125 MG/1; MG/1
1 TABLET, FILM COATED ORAL
Status: COMPLETED | OUTPATIENT
Start: 2021-10-29 | End: 2021-10-29

## 2021-10-29 RX ORDER — SODIUM CHLORIDE 0.9 % (FLUSH) 0.9 %
5-40 SYRINGE (ML) INJECTION EVERY 8 HOURS
Status: DISCONTINUED | OUTPATIENT
Start: 2021-10-29 | End: 2021-10-29 | Stop reason: HOSPADM

## 2021-10-29 RX ORDER — LIDOCAINE HYDROCHLORIDE 10 MG/ML
0.1 INJECTION, SOLUTION EPIDURAL; INFILTRATION; INTRACAUDAL; PERINEURAL AS NEEDED
Status: DISCONTINUED | OUTPATIENT
Start: 2021-10-29 | End: 2021-10-29 | Stop reason: HOSPADM

## 2021-10-29 RX ORDER — KETAMINE HYDROCHLORIDE 10 MG/ML
INJECTION, SOLUTION INTRAMUSCULAR; INTRAVENOUS AS NEEDED
Status: DISCONTINUED | OUTPATIENT
Start: 2021-10-29 | End: 2021-10-29 | Stop reason: HOSPADM

## 2021-10-29 RX ORDER — SUCCINYLCHOLINE CHLORIDE 20 MG/ML
INJECTION INTRAMUSCULAR; INTRAVENOUS AS NEEDED
Status: DISCONTINUED | OUTPATIENT
Start: 2021-10-29 | End: 2021-10-29 | Stop reason: HOSPADM

## 2021-10-29 RX ORDER — MIDAZOLAM HYDROCHLORIDE 1 MG/ML
0.5 INJECTION, SOLUTION INTRAMUSCULAR; INTRAVENOUS
Status: DISCONTINUED | OUTPATIENT
Start: 2021-10-29 | End: 2021-10-29 | Stop reason: HOSPADM

## 2021-10-29 RX ORDER — BUSPIRONE HYDROCHLORIDE 15 MG/1
15 TABLET ORAL
COMMUNITY

## 2021-10-29 RX ORDER — DEXAMETHASONE SODIUM PHOSPHATE 4 MG/ML
INJECTION, SOLUTION INTRA-ARTICULAR; INTRALESIONAL; INTRAMUSCULAR; INTRAVENOUS; SOFT TISSUE AS NEEDED
Status: DISCONTINUED | OUTPATIENT
Start: 2021-10-29 | End: 2021-10-29 | Stop reason: HOSPADM

## 2021-10-29 RX ORDER — OXYCODONE AND ACETAMINOPHEN 5; 325 MG/1; MG/1
1 TABLET ORAL AS NEEDED
Status: DISCONTINUED | OUTPATIENT
Start: 2021-10-29 | End: 2021-10-29 | Stop reason: HOSPADM

## 2021-10-29 RX ORDER — FLUOXETINE HYDROCHLORIDE 40 MG/1
40 CAPSULE ORAL DAILY
COMMUNITY

## 2021-10-29 RX ORDER — FENTANYL CITRATE 50 UG/ML
INJECTION, SOLUTION INTRAMUSCULAR; INTRAVENOUS AS NEEDED
Status: DISCONTINUED | OUTPATIENT
Start: 2021-10-29 | End: 2021-10-29 | Stop reason: HOSPADM

## 2021-10-29 RX ORDER — PROPOFOL 10 MG/ML
INJECTION, EMULSION INTRAVENOUS AS NEEDED
Status: DISCONTINUED | OUTPATIENT
Start: 2021-10-29 | End: 2021-10-29 | Stop reason: HOSPADM

## 2021-10-29 RX ORDER — MIDAZOLAM HYDROCHLORIDE 1 MG/ML
INJECTION, SOLUTION INTRAMUSCULAR; INTRAVENOUS AS NEEDED
Status: DISCONTINUED | OUTPATIENT
Start: 2021-10-29 | End: 2021-10-29 | Stop reason: HOSPADM

## 2021-10-29 RX ORDER — AMOXICILLIN AND CLAVULANATE POTASSIUM 875; 125 MG/1; MG/1
1 TABLET, FILM COATED ORAL 2 TIMES DAILY
Qty: 20 TABLET | Refills: 0 | Status: SHIPPED | OUTPATIENT
Start: 2021-10-29 | End: 2021-11-08

## 2021-10-29 RX ORDER — SODIUM CHLORIDE, SODIUM LACTATE, POTASSIUM CHLORIDE, CALCIUM CHLORIDE 600; 310; 30; 20 MG/100ML; MG/100ML; MG/100ML; MG/100ML
INJECTION, SOLUTION INTRAVENOUS
Status: DISCONTINUED | OUTPATIENT
Start: 2021-10-29 | End: 2021-10-29 | Stop reason: HOSPADM

## 2021-10-29 RX ORDER — PHENYLEPHRINE HCL IN 0.9% NACL 0.4MG/10ML
SYRINGE (ML) INTRAVENOUS AS NEEDED
Status: DISCONTINUED | OUTPATIENT
Start: 2021-10-29 | End: 2021-10-29 | Stop reason: HOSPADM

## 2021-10-29 RX ORDER — MIDAZOLAM HYDROCHLORIDE 1 MG/ML
1 INJECTION, SOLUTION INTRAMUSCULAR; INTRAVENOUS AS NEEDED
Status: DISCONTINUED | OUTPATIENT
Start: 2021-10-29 | End: 2021-10-29 | Stop reason: HOSPADM

## 2021-10-29 RX ORDER — PROPOFOL 10 MG/ML
INJECTION, EMULSION INTRAVENOUS
Status: DISCONTINUED | OUTPATIENT
Start: 2021-10-29 | End: 2021-10-29 | Stop reason: HOSPADM

## 2021-10-29 RX ORDER — ROCURONIUM BROMIDE 10 MG/ML
INJECTION, SOLUTION INTRAVENOUS AS NEEDED
Status: DISCONTINUED | OUTPATIENT
Start: 2021-10-29 | End: 2021-10-29 | Stop reason: HOSPADM

## 2021-10-29 RX ORDER — METFORMIN HYDROCHLORIDE 500 MG/1
500 TABLET ORAL 2 TIMES DAILY WITH MEALS
COMMUNITY

## 2021-10-29 RX ORDER — DIPHENHYDRAMINE HYDROCHLORIDE 50 MG/ML
12.5 INJECTION, SOLUTION INTRAMUSCULAR; INTRAVENOUS AS NEEDED
Status: DISCONTINUED | OUTPATIENT
Start: 2021-10-29 | End: 2021-10-29 | Stop reason: HOSPADM

## 2021-10-29 RX ORDER — SODIUM CHLORIDE 9 MG/ML
25 INJECTION, SOLUTION INTRAVENOUS CONTINUOUS
Status: DISCONTINUED | OUTPATIENT
Start: 2021-10-29 | End: 2021-10-29 | Stop reason: HOSPADM

## 2021-10-29 RX ORDER — DEXMEDETOMIDINE HYDROCHLORIDE 100 UG/ML
INJECTION, SOLUTION INTRAVENOUS AS NEEDED
Status: DISCONTINUED | OUTPATIENT
Start: 2021-10-29 | End: 2021-10-29 | Stop reason: HOSPADM

## 2021-10-29 RX ORDER — SCOLOPAMINE TRANSDERMAL SYSTEM 1 MG/1
1 PATCH, EXTENDED RELEASE TRANSDERMAL
Status: DISCONTINUED | OUTPATIENT
Start: 2021-10-29 | End: 2021-10-29 | Stop reason: HOSPADM

## 2021-10-29 RX ORDER — FENTANYL CITRATE 50 UG/ML
25 INJECTION, SOLUTION INTRAMUSCULAR; INTRAVENOUS
Status: DISCONTINUED | OUTPATIENT
Start: 2021-10-29 | End: 2021-10-29 | Stop reason: HOSPADM

## 2021-10-29 RX ORDER — EPHEDRINE SULFATE/0.9% NACL/PF 50 MG/5 ML
SYRINGE (ML) INTRAVENOUS AS NEEDED
Status: DISCONTINUED | OUTPATIENT
Start: 2021-10-29 | End: 2021-10-29 | Stop reason: HOSPADM

## 2021-10-29 RX ORDER — LIDOCAINE HYDROCHLORIDE 20 MG/ML
INJECTION, SOLUTION EPIDURAL; INFILTRATION; INTRACAUDAL; PERINEURAL AS NEEDED
Status: DISCONTINUED | OUTPATIENT
Start: 2021-10-29 | End: 2021-10-29 | Stop reason: HOSPADM

## 2021-10-29 RX ORDER — ACETAMINOPHEN 325 MG/1
650 TABLET ORAL ONCE
Status: COMPLETED | OUTPATIENT
Start: 2021-10-29 | End: 2021-10-29

## 2021-10-29 RX ORDER — MORPHINE SULFATE 2 MG/ML
2 INJECTION, SOLUTION INTRAMUSCULAR; INTRAVENOUS
Status: DISCONTINUED | OUTPATIENT
Start: 2021-10-29 | End: 2021-10-29 | Stop reason: HOSPADM

## 2021-10-29 RX ORDER — HYDROMORPHONE HYDROCHLORIDE 2 MG/ML
INJECTION, SOLUTION INTRAMUSCULAR; INTRAVENOUS; SUBCUTANEOUS AS NEEDED
Status: DISCONTINUED | OUTPATIENT
Start: 2021-10-29 | End: 2021-10-29 | Stop reason: HOSPADM

## 2021-10-29 RX ORDER — HYDROMORPHONE HYDROCHLORIDE 1 MG/ML
0.2 INJECTION, SOLUTION INTRAMUSCULAR; INTRAVENOUS; SUBCUTANEOUS
Status: DISCONTINUED | OUTPATIENT
Start: 2021-10-29 | End: 2021-10-29 | Stop reason: HOSPADM

## 2021-10-29 RX ORDER — FENTANYL CITRATE 50 UG/ML
50 INJECTION, SOLUTION INTRAMUSCULAR; INTRAVENOUS AS NEEDED
Status: DISCONTINUED | OUTPATIENT
Start: 2021-10-29 | End: 2021-10-29 | Stop reason: HOSPADM

## 2021-10-29 RX ORDER — DEXTROAMPHETAMINE SACCHARATE, AMPHETAMINE ASPARTATE, DEXTROAMPHETAMINE SULFATE AND AMPHETAMINE SULFATE 5; 5; 5; 5 MG/1; MG/1; MG/1; MG/1
20 TABLET ORAL 2 TIMES DAILY
COMMUNITY

## 2021-10-29 RX ADMIN — PHENYLEPHRINE HYDROCHLORIDE 40 MCG/MIN: 10 INJECTION INTRAVENOUS at 09:35

## 2021-10-29 RX ADMIN — SUCCINYLCHOLINE CHLORIDE 120 MG: 20 INJECTION, SOLUTION INTRAMUSCULAR; INTRAVENOUS at 07:35

## 2021-10-29 RX ADMIN — PROMETHAZINE HYDROCHLORIDE 25 MG: 25 INJECTION INTRAMUSCULAR; INTRAVENOUS at 10:45

## 2021-10-29 RX ADMIN — IOPAMIDOL 100 ML: 612 INJECTION, SOLUTION INTRAVENOUS at 09:20

## 2021-10-29 RX ADMIN — SODIUM CHLORIDE, POTASSIUM CHLORIDE, SODIUM LACTATE AND CALCIUM CHLORIDE: 600; 310; 30; 20 INJECTION, SOLUTION INTRAVENOUS at 07:28

## 2021-10-29 RX ADMIN — PROPOFOL 50 MG: 10 INJECTION, EMULSION INTRAVENOUS at 08:46

## 2021-10-29 RX ADMIN — PROPOFOL 50 MG: 10 INJECTION, EMULSION INTRAVENOUS at 09:20

## 2021-10-29 RX ADMIN — PROPOFOL 50 MG: 10 INJECTION, EMULSION INTRAVENOUS at 07:58

## 2021-10-29 RX ADMIN — PROPOFOL 150 MCG/KG/MIN: 10 INJECTION, EMULSION INTRAVENOUS at 07:35

## 2021-10-29 RX ADMIN — Medication 80 MCG: at 09:27

## 2021-10-29 RX ADMIN — Medication 40 MCG: at 08:07

## 2021-10-29 RX ADMIN — SODIUM CHLORIDE, POTASSIUM CHLORIDE, SODIUM LACTATE AND CALCIUM CHLORIDE 125 ML/HR: 600; 310; 30; 20 INJECTION, SOLUTION INTRAVENOUS at 10:46

## 2021-10-29 RX ADMIN — Medication 40 MCG: at 08:09

## 2021-10-29 RX ADMIN — DEXAMETHASONE SODIUM PHOSPHATE 8 MG: 4 INJECTION, SOLUTION INTRAMUSCULAR; INTRAVENOUS at 07:41

## 2021-10-29 RX ADMIN — Medication 80 MCG: at 08:50

## 2021-10-29 RX ADMIN — Medication 10 MG: at 09:36

## 2021-10-29 RX ADMIN — Medication 40 MCG: at 09:28

## 2021-10-29 RX ADMIN — HYDROMORPHONE HYDROCHLORIDE 1 MG: 2 INJECTION, SOLUTION INTRAMUSCULAR; INTRAVENOUS; SUBCUTANEOUS at 09:16

## 2021-10-29 RX ADMIN — DEXMEDETOMIDINE HYDROCHLORIDE 8 MCG: 100 INJECTION, SOLUTION, CONCENTRATE INTRAVENOUS at 09:20

## 2021-10-29 RX ADMIN — DEXMEDETOMIDINE HYDROCHLORIDE 4 MCG: 100 INJECTION, SOLUTION, CONCENTRATE INTRAVENOUS at 08:00

## 2021-10-29 RX ADMIN — SODIUM CHLORIDE, POTASSIUM CHLORIDE, SODIUM LACTATE AND CALCIUM CHLORIDE 125 ML/HR: 600; 310; 30; 20 INJECTION, SOLUTION INTRAVENOUS at 07:14

## 2021-10-29 RX ADMIN — ROCURONIUM BROMIDE 20 MG: 10 SOLUTION INTRAVENOUS at 08:09

## 2021-10-29 RX ADMIN — PROPOFOL 120 MG: 10 INJECTION, EMULSION INTRAVENOUS at 07:34

## 2021-10-29 RX ADMIN — MIDAZOLAM 2 MG: 1 INJECTION INTRAMUSCULAR; INTRAVENOUS at 07:28

## 2021-10-29 RX ADMIN — WATER 1 G: 1 INJECTION INTRAMUSCULAR; INTRAVENOUS; SUBCUTANEOUS at 07:47

## 2021-10-29 RX ADMIN — DEXMEDETOMIDINE HYDROCHLORIDE 4 MCG: 100 INJECTION, SOLUTION, CONCENTRATE INTRAVENOUS at 08:43

## 2021-10-29 RX ADMIN — Medication 40 MCG: at 08:43

## 2021-10-29 RX ADMIN — AMOXICILLIN AND CLAVULANATE POTASSIUM 1 TABLET: 875; 125 TABLET, FILM COATED ORAL at 10:02

## 2021-10-29 RX ADMIN — FENTANYL CITRATE 50 MCG: 50 INJECTION, SOLUTION INTRAMUSCULAR; INTRAVENOUS at 07:34

## 2021-10-29 RX ADMIN — ROCURONIUM BROMIDE 5 MG: 10 SOLUTION INTRAVENOUS at 07:34

## 2021-10-29 RX ADMIN — ROCURONIUM BROMIDE 25 MG: 10 SOLUTION INTRAVENOUS at 07:47

## 2021-10-29 RX ADMIN — ROCURONIUM BROMIDE 30 MG: 10 SOLUTION INTRAVENOUS at 08:25

## 2021-10-29 RX ADMIN — KETAMINE HYDROCHLORIDE 25 MG: 10 INJECTION, SOLUTION INTRAMUSCULAR; INTRAVENOUS at 09:24

## 2021-10-29 RX ADMIN — LIDOCAINE HYDROCHLORIDE 60 MG: 20 INJECTION, SOLUTION EPIDURAL; INFILTRATION; INTRACAUDAL; PERINEURAL at 07:34

## 2021-10-29 RX ADMIN — ROCURONIUM BROMIDE 20 MG: 10 SOLUTION INTRAVENOUS at 08:49

## 2021-10-29 RX ADMIN — DEXMEDETOMIDINE HYDROCHLORIDE 4 MCG: 100 INJECTION, SOLUTION, CONCENTRATE INTRAVENOUS at 08:29

## 2021-10-29 RX ADMIN — ACETAMINOPHEN 650 MG: 325 TABLET ORAL at 07:20

## 2021-10-29 RX ADMIN — SUGAMMADEX 150 MG: 100 INJECTION, SOLUTION INTRAVENOUS at 10:07

## 2021-10-29 RX ADMIN — PROPOFOL 50 MG: 10 INJECTION, EMULSION INTRAVENOUS at 09:22

## 2021-10-29 NOTE — INTERVAL H&P NOTE
Update History & Physical    The Patient's History and Physical of October 16, 2021 was reviewed with the patient and I examined the patient. There was no change. The surgical site was confirmed by the patient and me. Plan:  The risk, benefits, expected outcome, and alternative to the recommended procedure have been discussed with the patient. Patient understands and wants to proceed with the procedure.     Electronically signed by Preet Faria MD on 10/29/2021 at 6:53 AM

## 2021-10-29 NOTE — ROUTINE PROCESS
Patient: Harmony Akbar MRN: [id number]  SSN: [id number]   YOB: 1965  Age: 54 y.o. Sex: female     Patient is status post Procedure(s):  RIGHT BREAST CAPSULECTOMY, BILATERAL MASTOPEXY.     Surgeon(s) and Role:     * Lori Durbin MD - Primary    Local/Dose/Irrigation:  SEE MAR                  Peripheral IV 10/29/21 Left Antecubital (Active)            Airway - Endotracheal Tube 10/29/21 Oral (Active)                   Dressing/Packing:  Incision 10/29/21 Breast-Dressing/Treatment: Xeroform;Gauze dressing/dressing sponge;Surgical bra (10/29/21 6508)    Splint/Cast:  ]    Other:

## 2021-10-29 NOTE — ANESTHESIA PREPROCEDURE EVALUATION
Anesthetic History     PONV          Review of Systems / Medical History  Patient summary reviewed, nursing notes reviewed and pertinent labs reviewed    Pulmonary                   Neuro/Psych              Cardiovascular    Hypertension                   GI/Hepatic/Renal                Endo/Other      Hypothyroidism       Other Findings              Physical Exam    Airway  Mallampati: I  TM Distance: > 6 cm  Neck ROM: normal range of motion   Mouth opening: Normal     Cardiovascular    Rhythm: regular  Rate: normal         Dental  No notable dental hx       Pulmonary  Breath sounds clear to auscultation               Abdominal         Other Findings            Anesthetic Plan    ASA: 2  Anesthesia type: general and total IV anesthesia          Induction: Intravenous  Anesthetic plan and risks discussed with: Patient

## 2021-10-29 NOTE — ED PROVIDER NOTES
HPI       49y F here with L facial swelling. Started a few days ago. She believes she has a cracked tooth in the L lower jaw. Thinks maybe a filling came out. No fever. Can open her mouth widely but has some pain due to pressure from swelling in the jaw. No swelling or fullness under the tongue. No chest pain. No trouble breathing. No fever. No rash. No overlying skin changes. Past Medical History:   Diagnosis Date    Depression     Hypothyroidism        History reviewed. No pertinent surgical history. History reviewed. No pertinent family history. Social History     Socioeconomic History    Marital status: Not on file     Spouse name: Not on file    Number of children: Not on file    Years of education: Not on file    Highest education level: Not on file   Occupational History    Not on file   Tobacco Use    Smoking status: Not on file   Substance and Sexual Activity    Alcohol use: Not on file    Drug use: Not on file    Sexual activity: Not on file   Other Topics Concern    Not on file   Social History Narrative    Not on file     Social Determinants of Health     Financial Resource Strain:     Difficulty of Paying Living Expenses:    Food Insecurity:     Worried About Running Out of Food in the Last Year:     [address] in the Last Year:    Transportation Needs:     Lack of Transportation (Medical):      Lack of Transportation (Non-Medical):    Physical Activity:     Days of Exercise per Week:     Minutes of Exercise per Session:    Stress:     Feeling of Stress :    Social Connections:     Frequency of Communication with Friends and Family:     Frequency of Social Gatherings with Friends and Family:     Attends Mosque Services:     Active Member of Clubs or Organizations:     Attends Club or Organization Meetings:     Marital Status:    Intimate Partner Violence:     Fear of Current or Ex-Partner:     Emotionally Abused:     Physically Abused:     Sexually Abused: ALLERGIES: Patient has no known allergies. Review of Systems   Review of Systems   Constitutional: (-) weight loss. HEENT: (-) stiff neck   Eyes: (-) discharge. Respiratory: (-) cough. Cardiovascular: (-) syncope. Gastrointestinal: (-) blood in stool. Genitourinary: (-) hematuria. Musculoskeletal: (-) myalgias. Neurological: (-) seizure. Skin: (-) petechiae  Lymph/Immunologic: (-) enlarged lymph nodes  All other systems reviewed and are negative. Vitals:    10/29/21 0718   BP: (!) 146/80   Pulse: (!) 115   Resp: 16   Temp: 98.2 °F (36.8 °C)   SpO2: 98%            Physical Exam Nursing note and vitals reviewed. Constitutional: oriented to person, place, and time. appears well-developed and well-nourished. No distress. Head: Normocephalic and atraumatic. Sclera anicteric  Nose: No rhinorrhea  FACE: diffuse swelling around the L jaw at the angle of the mandible. It is tender to palpation. No erythema. Mouth/Throat: Oropharynx is clear and moist. Pharynx normal. Tooth 17 with black edge at the base of the gum. No obvious abscess or swelling in the gums. No swelling or bogginess under the tongue. Eyes: Conjunctivae are normal. Pupils are equal, round, and reactive to light. Right eye exhibits no discharge. Left eye exhibits no discharge. Neck: Painless normal range of motion. Neck supple. No LAD. Cardiovascular: Normal rate, regular rhythm, normal heart sounds and intact distal pulses. Exam reveals no gallop and no friction rub. No murmur heard. Pulmonary/Chest:  No respiratory distress. No wheezes. No rales. No rhonchi. No increased work of breathing. No accessory muscle use. Good air exchange throughout. Abdominal: soft, non-tender, no rebound or guarding. No hepatosplenomegaly. Normal bowel sounds throughout. Back: no tenderness to palpation, no deformities, no CVA tenderness  Extremities/Musculoskeletal: Normal range of motion. no tenderness. No edema.  Distal extremities are neurovasc intact. Lymphadenopathy:   No adenopathy. Neurological:  Alert and oriented to person, place, and time. Coordination normal. CN 2-12 intact. Motor and sensory function intact. Skin: Skin is warm and dry. No rash noted. No pallor. MDM 49y F here with facial swelling like from dental infection. Will check CT max/face with contrast.       Procedures    9:49 AM  Labs ok. CT with abscess and overlying cellulitis. Discussed with oral surgery (Dr. Edna Jeffrey) - they can see today and asked to have her call. Asked to put on augmentin 875mg BID. Pt aware and understands the plan. Pt continues to appear well. No distress. Has been eating/drinking well. Return precautions discussed in detail.

## 2021-10-29 NOTE — ED TRIAGE NOTES
54year old female pt comes to the ED via POV for a CC Dental Problem. Pt states that she has a cracked lower left molar tooth that she made a doctors appointment before but the pt has swelling on her left side of her face. Pt has significant face swelling on the left side with no airway difficulties. Pt currently rates her pain 7 out of 10 for pain. Pt is A&Ox4.

## 2021-10-29 NOTE — DISCHARGE INSTRUCTIONS
Leave the surgical garment and ace wrap ON and DRY for 48 hours then may remove for shower. Resume any important pre op medications and diet but use sport drinks like gatorade or power aid instead of water for 2-3 days and extra protein in diet helps wounds heal.  Keep head elevated at night when sleeping about 30 degrees, do not lay flat for about 2 weeks  Walk every 1-2 hours during the day in house while awake for 5-10 minutes during the first 2 weeks  NO strenuous activity or exercise for 4 weeks  Use stool softeners to prevent constipation  Do not take pain medications on an empty stomach  When you shower, remove the ace wrap and the surgical garment, discard any lose gauze, shower like you normally would, place antibiotic ointment of choice over all incisions with clean gauze, and place the second garment on as you found the first one. You may NOT replace the ace wrap for now, but save it as we may reuse it later. You may now repeat the shower daily or every other day, and launder the garments in between use. Anticipate a phone call from Dr. Maria T Aburto have a scopolamine patch behind your LEFT ear. This is to help prevent nausea. This patch can be worn for up to 3 days. The most common side effects are dry mouth/dry eyes. If you are not experiencing nausea and are experiencing side effects you may remove the patch sooner. Please remove the patch no later than 3 DAYS. Be sure to dispose of patch where children or pets cannot access it, wash your hands thoroughly, and do not touch your eyes.         DISCHARGE SUMMARY from Nurse                   POST ANESTHESIA INSTRUCTIONS    PATIENT INSTRUCTIONS:    After general anesthesia or intravenous sedation, for 24 hours or while taking prescription Narcotics:  · Limit your activities  · Do not drive and operate hazardous machinery  · Do not make important personal or business decisions  · Do  not drink alcoholic beverages  · If you have not urinated within 8 hours after discharge, please contact your surgeon on call. Report the following to your surgeon:  · Excessive pain, swelling, redness or odor of or around the surgical area  · Temperature over 100.5  · Nausea and vomiting lasting longer than 4 hours or if unable to take medications  · Any signs of decreased circulation or nerve impairment to extremity: change in color, persistent  numbness, tingling, coldness or increase pain  · Any questions    What to do at Home:      If you have any concerns, please notify Dr Nasario Rubinstein. *  Please give a list of your current medications to your Primary Care Provider. *  Please update this list whenever your medications are discontinued, doses are      changed, or new medications (including over-the-counter products) are added. *  Please carry medication information at all times in case of emergency situations. These are general instructions for a healthy lifestyle:    No smoking/ No tobacco products/ Avoid exposure to second hand smoke  Surgeon General's Warning:  Quitting smoking now greatly reduces serious risk to your health. Obesity, smoking, and sedentary lifestyle greatly increases your risk for illness    A healthy diet, regular physical exercise & weight monitoring are important for maintaining a healthy lifestyle    You may be retaining fluid if you have a history of heart failure or if you experience any of the following symptoms:  Weight gain of 3 pounds or more overnight or 5 pounds in a week, increased swelling in our hands or feet or shortness of breath while lying flat in bed. Please call your doctor as soon as you notice any of these symptoms; do not wait until your next office visit. The discharge information has been reviewed with the patient and caregiver. The patient and caregiver verbalized understanding.   Discharge medications reviewed with the patient and caregiver and appropriate educational materials and side effects teaching were provided.   ___________________________________________________________________________________________________________________________________

## 2021-10-29 NOTE — OP NOTES
[address]   OPERATIVE REPORT    Name:  Byron Kong  MR#:  [id number]  :  1965  ACCOUNT #:  [de-identified]  DATE OF SERVICE:  10/29/2021    PREOPERATIVE DIAGNOSES:  Personal history of previous breast augmentation, delayed right breast capsular contracture, grade III, recurrent pseudoptosis left breast, desires revision breast surgery. POSTOPERATIVE DIAGNOSES:  Personal history of previous breast augmentation, delayed right breast capsular contracture, grade III, recurrent pseudoptosis left breast, desires revision breast surgery. PROCEDURE PERFORMED:  Right total open capsulectomy, right donut periareolar mastopexy, left Wise pattern mastopexy. SURGEON:  Eleuterio Trujillo MD    ASSISTANT:  Bang Murrieta. ANESTHESIA:  General.    COMPLICATIONS:  None. SPECIMENS REMOVED:  Skin, bilateral breasts, negligible, not weighed, discarded. IMPLANTS:  Existing 425 mL cohesive gel device replaced back in the right chest wall, previously described. ESTIMATED BLOOD LOSS:  Approximately 25 mL. IV FLUIDS:  About 800-900 mL. URINE OUTPUT:  350 mL. DRAINS:  None. STAFF:  OR Staff, Room #4, [address] [facility]. FINDINGS:  Normal for age and history. Grade III capsular contracture of the right breast.    INDICATION FOR PROCEDURE:  The patient is a pleasant 59-year-old female who has a history of primary breast augmentation a number of years ago by a separate surgeon. She has had a number of repeat operations to deal with recurrent ptosis and scar deformities with the last being in 2019. She was seen about 6 months ago for the development of capsular changes on the right with more firm changes and displacement of implant in the cephalic direction. She was interested in a total capsulectomy and repeated mastopexy on the right to improve tone and appearance, and to address the pseudoptosis on the left with repeat of the mastopexy.     PROCEDURE: After appropriate consent was obtained, preoperative markings were placed. She was taken to the operating room and placed on the operating room table in supine position. Satisfactory general endotracheal anesthesia was obtained. SCD devices were placed per routine. Our local anesthesia solution was injected in the dermal plane based on our markings. Her chest was sterilely prepped and draped. We began on the patient's right side reducing the nipple-areolar complex to 42 mm with a cookie cutter. We made an incision at the 6 o'clock position to gain access to the breast implant capsule and the implant. The existing 425 mL smooth round cohesive gel device was removed from the capsule and placed on the back table in Irrisept irrigation solution. Total open capsulectomy was performed with electrocautery with care taken to avoid injury to the pectoralis major muscle. A small segment of the capsule that was adherent to the anterior ridge and the chest wall was left intact. Small amount of hemostasis was achieved laterally. In addition, capsulorrhaphy sutures were used to close off the lateral defect that was created with capsulectomy to prevent the implant from sliding lateral to the chest wall and the axilla. The pocket was irrigated with Irrisept irrigation solution and sterile saline. The existing 425 mL implant was placed back in the pocket and the breast tissues were reapproximated in layers with 3-0 Vicryl suture in interrupted buried fashion. A small ring of tissue was excised from around the nipple-areolar complex and a periareolar pinwheel pursestring suture with 3-0 Prolene was used to place the periareolar mastopexy and synch the outer [place] down to the inner [place]. The superficial skin was then reapproximated with 4-0 Monocryl in a running subcuticular fashion. On the left side, tailor tacking was performed with surgical staples on our markings to ensure placement prior to commitment.   It became apparent that based on the changes that occurred on the right side, a small horizontal component measuring about 6 or 7 cm was going to be necessary in addition to the vertical and periareolar incisions. This was all performed with a #10 scalpel blade and this area was de-epithelialized. There was no undermining performed in the breast skin and the skin wounds were closed in two layers with 3-0 Vicryl and 4-0 Monocryl. The same 3-0 Prolene suture was used to create the pinwheel pursestring suture on the left nipple-areolar complex as well. Sterile dressings of Xeroform gauze, 4x4 gauze, and a surgical bra with an Ace wrap were placed. At the end of the bilateral procedures, sponge and needle counts were reported as correct. She was awakened, extubated, and transferred to the recovery room in satisfactory and stable condition. She will be discharged home today in care of her family with prescriptions and instructions and will follow up with me within 1 week.         Tahir Chen MD      [id number]  D:  10/29/2021 10:39  T:  10/29/2021 15:06  JOB #:  [id number]  CC:  Bruna Brar MD

## 2022-03-19 PROBLEM — Z41.1 ENCOUNTER FOR COSMETIC SURGERY: Status: ACTIVE | Noted: 2019-01-22

## 2022-04-14 ENCOUNTER — ANESTHESIA EVENT (OUTPATIENT)
Dept: MEDSURG UNIT | Age: 57
End: 2022-04-14
Payer: SELF-PAY

## 2022-04-14 NOTE — H&P
Dragan Fajardo  : 1965  DOS: 2022      Chief Complaint: consultation       Allergies: Zofran , percocet , Compazine       Medications: clonazePAM , FLUoxetine , Advil 200 mg oral tablet , Synthroid       Medical History: Height: 5' 2\", Weight: 120 lbs    Pulmonary System: allergies  Cardiac System: Negative  Blood and Liver Systems: Negative  Neurologic and Endocrine Systems: thyroid trouble  GI,  and Reproductive Systems: Negative  Cancer: Negative   Surgical History: tip rhinoplasty and septoplasty with dr Maritza Butron ,  Cosmetic revision right capsulectomy, right donut mastopexy revision, left vertical mastopexy revision ,  Bilateral Breast implant removal and replacement ,  Cosmetic Unilateral Do-Nut Mastopexy(left) w Unilateral Implant Removal and Exchange(right) ,  partial hysterectomy (ovaries retained) ,  Septoplasty Revision ,  Breast Augmentation Revision ,  Abdominoplasty ,  Breast Implant Revision ,  Septoplasty ,   Breast Augmentation       Family History: Heart Disease Father , High Blood Pressure Mother , Cancer Father       Social History: Smoking Status  4 Never smoker    Alcohol Use Drinks per week: 4-5     Pregnancy Number of pregnancies:  1    Number of children:0          Ms. Javier Espinoza is a pleasant 29-year-old female who has had multiple elective breast surgery procedures including implants and mastopexy. Her last procedure was on  where she had a right total capsulectomy, combined with pee areolar mastopexy and left vertical mastopexy. She was last seen on  and returns today doing well and beginning to return to preoperative activity. She is however concerned about the appearance of both breasts and some persistent postoperative asymmetry and irregularities. She denies fever or chills and is using a light surgical garment. Review of systems reveals normal heart and lung sounds. Examination demonstrates well-healed scars that appear a little more vascular and immature especially on her right side. She has laxity on the right breast on top of the implant, resulting in pseudoptosis of a few centimeters. On the left side the breast tissues are higher and above the fold, but she still has a contour defect at the 6 oclock position of the nipple and fullness at the 12 oclock position above. Unfortunately, is apparent that Ms. Asia Howell is going to require an additional revision or touch-up procedure to improve symmetry and balance. I would add a vertical component on her right breast to address the postop pseudoptosis on top of her implants and improve the position and shape. On her left breast she will require some adjustments at the 6:00 and 12 oclock position from skin tightness and excess subcutaneous tissue. Any incisions will be closed in layers with dissolvable sutures and a compression garment will be used with associated activity restrictions for at least 4-6 weeks. This is all performed under a general anesthetic on an outpatient basis with local anesthesia. The risks and complications include but are not limited to infection, pain, bleeding, hematomas requiring re-operation, skin sensitivity changes, vascular compromise to tissues resulting in partial or complete loss of tissues, resultant open wounds, the need for long term wound care and dressing changes and scarring, irregularities and asymmetries requiring touch-up and revision surgery, an unacceptable cosmetic result, and other things including cardiac and pulmonary risks that include death. She understands additional implant specific risks continue to include capsule contracture, scalloping or rippling, implant malposition, implant failure, and implant infection. We also discussed breast implant illness and RAVEN-ALCL.     Updated pictures were taken, questions were answered, and I will have Katy contact her regarding any fees, but I would suggest we wait an additional 3 months until she is 6 months out from surgery. The patient was counseled about the risks of gopi Covid-19 during their perioperative period and any recovery window from their procedure. The patient was made aware that gopi Covid-19 may worsen their prognosis for recovering from their procedure and lend to a higher morbidity and/or mortality risk. All material risks, benefits, and reasonable alternatives including postponing the procedure were discussed. The patient DOES wish to proceed with their procedure at this time. Robby Ware M.D.  FACS

## 2022-04-15 ENCOUNTER — ANESTHESIA (OUTPATIENT)
Dept: MEDSURG UNIT | Age: 57
End: 2022-04-15
Payer: SELF-PAY

## 2022-04-15 ENCOUNTER — HOSPITAL ENCOUNTER (OUTPATIENT)
Age: 57
Setting detail: OUTPATIENT SURGERY
Discharge: HOME | End: 2022-04-15
Attending: SURGERY | Admitting: SURGERY
Payer: SELF-PAY

## 2022-04-15 VITALS
DIASTOLIC BLOOD PRESSURE: 70 MMHG | HEART RATE: 75 BPM | OXYGEN SATURATION: 94 % | SYSTOLIC BLOOD PRESSURE: 108 MMHG | RESPIRATION RATE: 17 BRPM | TEMPERATURE: 97.6 F | WEIGHT: 118 LBS | BODY MASS INDEX: 22.3 KG/M2

## 2022-04-15 PROCEDURE — 74011250636 HC RX REV CODE- 250/636: Performed by: NURSE ANESTHETIST, CERTIFIED REGISTERED

## 2022-04-15 PROCEDURE — 76030000005 HC AMB SURG OR TIME 2.5 TO 3: Performed by: SURGERY

## 2022-04-15 PROCEDURE — 88305 TISSUE EXAM BY PATHOLOGIST: CPT

## 2022-04-15 PROCEDURE — 74011000250 HC RX REV CODE- 250: Performed by: SURGERY

## 2022-04-15 PROCEDURE — 77030002933 HC SUT MCRYL J&J -A: Performed by: SURGERY

## 2022-04-15 PROCEDURE — 74011250636 HC RX REV CODE- 250/636: Performed by: SURGERY

## 2022-04-15 PROCEDURE — 74011250637 HC RX REV CODE- 250/637: Performed by: ANESTHESIOLOGY

## 2022-04-15 PROCEDURE — 77030010509 HC AIRWY LMA MSK TELE -A: Performed by: ANESTHESIOLOGY

## 2022-04-15 PROCEDURE — 77030031139 HC SUT VCRL2 J&J -A: Performed by: SURGERY

## 2022-04-15 PROCEDURE — 76210000035 HC AMBSU PH I REC 1 TO 1.5 HR: Performed by: SURGERY

## 2022-04-15 PROCEDURE — 74011250636 HC RX REV CODE- 250/636: Performed by: ANESTHESIOLOGY

## 2022-04-15 PROCEDURE — 74011000250 HC RX REV CODE- 250: Performed by: NURSE ANESTHETIST, CERTIFIED REGISTERED

## 2022-04-15 PROCEDURE — 76060000065 HC AMB SURG ANES 2.5 TO 3 HR: Performed by: SURGERY

## 2022-04-15 PROCEDURE — 2709999900 HC NON-CHARGEABLE SUPPLY: Performed by: SURGERY

## 2022-04-15 PROCEDURE — 77030002966 HC SUT PDS J&J -A: Performed by: SURGERY

## 2022-04-15 PROCEDURE — 77030040361 HC SLV COMPR DVT MDII -B: Performed by: SURGERY

## 2022-04-15 RX ORDER — SCOLOPAMINE TRANSDERMAL SYSTEM 1 MG/1
1 PATCH, EXTENDED RELEASE TRANSDERMAL ONCE
Status: DISCONTINUED | OUTPATIENT
Start: 2022-04-15 | End: 2022-04-15 | Stop reason: HOSPADM

## 2022-04-15 RX ORDER — PROPOFOL 10 MG/ML
INJECTION, EMULSION INTRAVENOUS AS NEEDED
Status: DISCONTINUED | OUTPATIENT
Start: 2022-04-15 | End: 2022-04-15 | Stop reason: HOSPADM

## 2022-04-15 RX ORDER — SODIUM CHLORIDE 0.9 % (FLUSH) 0.9 %
5-40 SYRINGE (ML) INJECTION AS NEEDED
Status: DISCONTINUED | OUTPATIENT
Start: 2022-04-15 | End: 2022-04-15 | Stop reason: HOSPADM

## 2022-04-15 RX ORDER — DEXMEDETOMIDINE HYDROCHLORIDE 100 UG/ML
INJECTION, SOLUTION INTRAVENOUS AS NEEDED
Status: DISCONTINUED | OUTPATIENT
Start: 2022-04-15 | End: 2022-04-15 | Stop reason: HOSPADM

## 2022-04-15 RX ORDER — LIDOCAINE HYDROCHLORIDE 20 MG/ML
INJECTION, SOLUTION EPIDURAL; INFILTRATION; INTRACAUDAL; PERINEURAL AS NEEDED
Status: DISCONTINUED | OUTPATIENT
Start: 2022-04-15 | End: 2022-04-15 | Stop reason: HOSPADM

## 2022-04-15 RX ORDER — HYDROMORPHONE HYDROCHLORIDE 2 MG/ML
INJECTION, SOLUTION INTRAMUSCULAR; INTRAVENOUS; SUBCUTANEOUS AS NEEDED
Status: DISCONTINUED | OUTPATIENT
Start: 2022-04-15 | End: 2022-04-15 | Stop reason: HOSPADM

## 2022-04-15 RX ORDER — ACETAMINOPHEN 325 MG/1
650 TABLET ORAL ONCE
Status: COMPLETED | OUTPATIENT
Start: 2022-04-15 | End: 2022-04-15

## 2022-04-15 RX ORDER — DEXAMETHASONE SODIUM PHOSPHATE 4 MG/ML
INJECTION, SOLUTION INTRA-ARTICULAR; INTRALESIONAL; INTRAMUSCULAR; INTRAVENOUS; SOFT TISSUE AS NEEDED
Status: DISCONTINUED | OUTPATIENT
Start: 2022-04-15 | End: 2022-04-15 | Stop reason: HOSPADM

## 2022-04-15 RX ORDER — LIDOCAINE HYDROCHLORIDE 10 MG/ML
0.1 INJECTION, SOLUTION EPIDURAL; INFILTRATION; INTRACAUDAL; PERINEURAL AS NEEDED
Status: DISCONTINUED | OUTPATIENT
Start: 2022-04-15 | End: 2022-04-15 | Stop reason: HOSPADM

## 2022-04-15 RX ORDER — FENTANYL CITRATE 50 UG/ML
25 INJECTION, SOLUTION INTRAMUSCULAR; INTRAVENOUS
Status: DISCONTINUED | OUTPATIENT
Start: 2022-04-15 | End: 2022-04-15 | Stop reason: HOSPADM

## 2022-04-15 RX ORDER — HYDROMORPHONE HYDROCHLORIDE 1 MG/ML
0.2 INJECTION, SOLUTION INTRAMUSCULAR; INTRAVENOUS; SUBCUTANEOUS
Status: DISCONTINUED | OUTPATIENT
Start: 2022-04-15 | End: 2022-04-15 | Stop reason: HOSPADM

## 2022-04-15 RX ORDER — SODIUM CHLORIDE 0.9 % (FLUSH) 0.9 %
5-40 SYRINGE (ML) INJECTION EVERY 8 HOURS
Status: DISCONTINUED | OUTPATIENT
Start: 2022-04-15 | End: 2022-04-15 | Stop reason: HOSPADM

## 2022-04-15 RX ORDER — TRAZODONE HYDROCHLORIDE 50 MG/1
50 TABLET ORAL
COMMUNITY

## 2022-04-15 RX ORDER — MIDAZOLAM HYDROCHLORIDE 1 MG/ML
INJECTION, SOLUTION INTRAMUSCULAR; INTRAVENOUS AS NEEDED
Status: DISCONTINUED | OUTPATIENT
Start: 2022-04-15 | End: 2022-04-15 | Stop reason: HOSPADM

## 2022-04-15 RX ORDER — MIDAZOLAM HYDROCHLORIDE 1 MG/ML
1 INJECTION, SOLUTION INTRAMUSCULAR; INTRAVENOUS AS NEEDED
Status: DISCONTINUED | OUTPATIENT
Start: 2022-04-15 | End: 2022-04-15 | Stop reason: HOSPADM

## 2022-04-15 RX ORDER — ONDANSETRON 2 MG/ML
4 INJECTION INTRAMUSCULAR; INTRAVENOUS AS NEEDED
Status: DISCONTINUED | OUTPATIENT
Start: 2022-04-15 | End: 2022-04-15 | Stop reason: HOSPADM

## 2022-04-15 RX ORDER — SODIUM CHLORIDE, SODIUM LACTATE, POTASSIUM CHLORIDE, CALCIUM CHLORIDE 600; 310; 30; 20 MG/100ML; MG/100ML; MG/100ML; MG/100ML
50 INJECTION, SOLUTION INTRAVENOUS CONTINUOUS
Status: DISCONTINUED | OUTPATIENT
Start: 2022-04-15 | End: 2022-04-15 | Stop reason: HOSPADM

## 2022-04-15 RX ORDER — FENTANYL CITRATE 50 UG/ML
50 INJECTION, SOLUTION INTRAMUSCULAR; INTRAVENOUS AS NEEDED
Status: DISCONTINUED | OUTPATIENT
Start: 2022-04-15 | End: 2022-04-15 | Stop reason: HOSPADM

## 2022-04-15 RX ORDER — PROPOFOL 10 MG/ML
INJECTION, EMULSION INTRAVENOUS
Status: DISCONTINUED | OUTPATIENT
Start: 2022-04-15 | End: 2022-04-15 | Stop reason: HOSPADM

## 2022-04-15 RX ORDER — FENTANYL CITRATE 50 UG/ML
INJECTION, SOLUTION INTRAMUSCULAR; INTRAVENOUS AS NEEDED
Status: DISCONTINUED | OUTPATIENT
Start: 2022-04-15 | End: 2022-04-15 | Stop reason: HOSPADM

## 2022-04-15 RX ADMIN — DEXMEDETOMIDINE HYDROCHLORIDE 2 MCG: 100 INJECTION, SOLUTION, CONCENTRATE INTRAVENOUS at 14:42

## 2022-04-15 RX ADMIN — WATER 2 G: 1 INJECTION INTRAMUSCULAR; INTRAVENOUS; SUBCUTANEOUS at 13:39

## 2022-04-15 RX ADMIN — MIDAZOLAM 0.5 MG: 1 INJECTION INTRAMUSCULAR; INTRAVENOUS at 14:45

## 2022-04-15 RX ADMIN — DEXMEDETOMIDINE HYDROCHLORIDE 4 MCG: 100 INJECTION, SOLUTION, CONCENTRATE INTRAVENOUS at 13:50

## 2022-04-15 RX ADMIN — FENTANYL CITRATE 25 MCG: 50 INJECTION, SOLUTION INTRAMUSCULAR; INTRAVENOUS at 14:21

## 2022-04-15 RX ADMIN — DEXMEDETOMIDINE HYDROCHLORIDE 3 MCG: 100 INJECTION, SOLUTION, CONCENTRATE INTRAVENOUS at 13:56

## 2022-04-15 RX ADMIN — LIDOCAINE HYDROCHLORIDE 50 MG: 20 INJECTION, SOLUTION EPIDURAL; INFILTRATION; INTRACAUDAL; PERINEURAL at 13:34

## 2022-04-15 RX ADMIN — MIDAZOLAM 0.5 MG: 1 INJECTION INTRAMUSCULAR; INTRAVENOUS at 15:20

## 2022-04-15 RX ADMIN — MIDAZOLAM 0.5 MG: 1 INJECTION INTRAMUSCULAR; INTRAVENOUS at 15:04

## 2022-04-15 RX ADMIN — PROPOFOL 160 MG: 10 INJECTION, EMULSION INTRAVENOUS at 13:34

## 2022-04-15 RX ADMIN — PROPOFOL 40 MG: 10 INJECTION, EMULSION INTRAVENOUS at 13:38

## 2022-04-15 RX ADMIN — FENTANYL CITRATE 25 MCG: 50 INJECTION, SOLUTION INTRAMUSCULAR; INTRAVENOUS at 14:34

## 2022-04-15 RX ADMIN — MIDAZOLAM 2 MG: 1 INJECTION INTRAMUSCULAR; INTRAVENOUS at 13:26

## 2022-04-15 RX ADMIN — FENTANYL CITRATE 50 MCG: 50 INJECTION, SOLUTION INTRAMUSCULAR; INTRAVENOUS at 13:50

## 2022-04-15 RX ADMIN — SODIUM CHLORIDE, POTASSIUM CHLORIDE, SODIUM LACTATE AND CALCIUM CHLORIDE 50 ML/HR: 600; 310; 30; 20 INJECTION, SOLUTION INTRAVENOUS at 11:56

## 2022-04-15 RX ADMIN — PROPOFOL 150 MCG/KG/MIN: 10 INJECTION, EMULSION INTRAVENOUS at 13:37

## 2022-04-15 RX ADMIN — HYDROMORPHONE HYDROCHLORIDE 0.6 MG: 2 INJECTION, SOLUTION INTRAMUSCULAR; INTRAVENOUS; SUBCUTANEOUS at 15:26

## 2022-04-15 RX ADMIN — PROPOFOL 30 MG: 10 INJECTION, EMULSION INTRAVENOUS at 14:21

## 2022-04-15 RX ADMIN — ACETAMINOPHEN 650 MG: 325 TABLET ORAL at 11:30

## 2022-04-15 RX ADMIN — MIDAZOLAM 0.5 MG: 1 INJECTION INTRAMUSCULAR; INTRAVENOUS at 14:49

## 2022-04-15 RX ADMIN — DEXAMETHASONE SODIUM PHOSPHATE 8 MG: 4 INJECTION, SOLUTION INTRAMUSCULAR; INTRAVENOUS at 14:02

## 2022-04-15 RX ADMIN — HYDROMORPHONE HYDROCHLORIDE 0.6 MG: 2 INJECTION, SOLUTION INTRAMUSCULAR; INTRAVENOUS; SUBCUTANEOUS at 14:42

## 2022-04-15 RX ADMIN — DEXMEDETOMIDINE HYDROCHLORIDE 3 MCG: 100 INJECTION, SOLUTION, CONCENTRATE INTRAVENOUS at 14:21

## 2022-04-15 RX ADMIN — DEXMEDETOMIDINE HYDROCHLORIDE 2 MCG: 100 INJECTION, SOLUTION, CONCENTRATE INTRAVENOUS at 14:36

## 2022-04-15 RX ADMIN — PROPOFOL 40 MG: 10 INJECTION, EMULSION INTRAVENOUS at 13:45

## 2022-04-15 NOTE — BRIEF OP NOTE
Brief Postoperative Note    Patient: Elle Painter  YOB: 1965  MRN: [id number]    Date of Procedure: 4/15/2022     Pre-Op Diagnosis: COSMETIC    Post-Op Diagnosis: Same as preoperative diagnosis. Procedure(s):  SCAR REVISION BILATERAL BREASTS, BREAST IMPLANT EXCHANGE    Surgeon(s):  James Giraldo MD    Surgical Assistant: Surg Asst-1: Kandi PINEDO    Anesthesia: General     Estimated Blood Loss (mL): less than 804     Complications: None    Specimens:   ID Type Source Tests Collected by Time Destination   1 : LEFT BREAST TISSUE Fresh Breast  James Giraldo MD 4/15/2022 1513 Pathology        Implants:   Implant Name Type Inv.  Item Serial No.  Lot No. LRB No. Used Action   IMPL BRST RND MP+ GEL 375ML --  - [id number]  IMPL BRST RND MP+ GEL 375ML --  [id number] [employer] [id number] Right 1 Implanted   IMPL BRST RND MP+ GEL 375ML --  - [id number]  IMPL BRST RND MP+ GEL 375ML --  [id number] [employer] [id number] Left 1 Implanted       Drains: * No LDAs found *    Findings: breast tissue asymmetry, intra op decision to remove left breast tissue    Electronically Signed by Chong Nguyen MD on 4/15/2022 at 4:05 PM

## 2022-04-15 NOTE — DISCHARGE INSTRUCTIONS
Leave the surgical garment ON and DRY for 48 hours then may remove for shower. Resume any important pre op medications and diet but use sport drinks like Gatorade or Powerade instead of water for 2-3 days and extra protein in diet helps wounds heal.    Keep head elevated at night when sleeping about 30 degrees, do not lay flat for about 2 weeks. Walk every 1-2 hours during the day in house while awake for 5-10 minutes during the first 2 weeks. NO strenuous activity or exercise for 4 weeks. Use stool softeners to prevent constipation. Do not take pain medications on an empty stomach. When you shower, remove the surgical garment and discard any lose gauze, shower like you normally would (no baths), place antibiotic ointment of choice over all incisions with clean gauze, and replace the surgical garment like you found it. Place extra gauze pads over the center of the left breast to place extra gentle pressure there. Call DR Laura Parsons at [phone] (cell) for questions. Anticipate a phone call from Dr. Lana Figueroa. Patient Education      Scopolamine (Transderm Scop) - (Absorbed through the skin)   Why this medicine is used:   Treat nausea and vomiting. Contact a nurse or doctor right away if you have:  · Seeing, hearing, or feeling things that are not there  · Confusion or memory loss  · Lightheadedness, dizziness, drowsiness, or fainting  · Blurred vision  · Trouble urinating     Common side effects:  · Skin rash or redness  · Restlessness or tiredness  · Dry mouth  © 2017 [facility] is for End User's use only and may not be sold, redistributed or otherwise used for commercial purposes. Patient Education   Scopolamine (Absorbed through the skin)   Scopolamine ([id number]-a-meen)  Treat nausea and vomiting. Brand Name(s): Transderm Scop   There may be other brand names for this medicine. When This Medicine Should Not Be Used:    You should not use this medicine if you have had an allergic reaction to scopolamine, or if you have narrow angle glaucoma. How to Use This Medicine:   Patch  · Your doctor will tell you how many patches to use, where to apply them, and how often to apply them. Do not use more patches or apply them more often than your doctor tells you to. · Read and follow the patient instructions that come with this medicine. Talk to your doctor or pharmacist if you have any questions. · To prevent motion sickness, apply the patch at least 4 hours before you need it. · Wash and dry your hands thoroughly before applying the patch. · Leave the patch in its sealed wrapper until you are ready to put it on. Tear the wrapper open carefully. NEVER CUT the wrapper or the patch with scissors. Do not use any patch that has been cut by accident. · Take the liner off the sticky side before applying. · Apply the patch to dry, hairless skin behind the ear. · If the patch is loose or falls off, apply a new patch at a different place behind the ear. · After you take off the patch, wash the place where the patch was and your hands thoroughly. · Only one patch should be used at any time. If a dose is missed:   · If you forget to wear or change a patch, put one on as soon as you can. If it is almost time to put on your next patch, wait until then to apply a new patch and skip the one you missed. Do not apply extra patches to make up for a missed dose. How to Store and Dispose of This Medicine:   · Store the patches at room temperature in a closed container, away from heat, moisture, and direct light. · Fold the used patch in half with the sticky sides together. Throw any used patch away so that children or pets cannot get to it. You will also need to throw away old patches after the expiration date has passed. · Keep all medicine out of the reach of children. Never share your medicine with anyone.   Drugs and Foods to Avoid:   Ask your doctor or pharmacist before using any other medicine, including over-the-counter medicines, vitamins, and herbal products. · Tell your doctor if you use anything else that makes you sleepy. Some examples are allergy medicine, narcotic pain medicine, and alcohol. · Do not drink alcohol while you are using this medicine. Warnings While Using This Medicine:   · Make sure your doctor knows if you are pregnant or breastfeeding, or if you have glaucoma, prostate problems, trouble urinating, blocked bowels, liver disease, kidney disease, or a history of seizures or mental illness. · This medicine can cause blurring of vision and other vision problems if it comes in contact with the eyes. This medicine may also cause problems with urination. If any of these reactions occur, remove the patch and call your doctor right away. · This medicine may make you dizzy or drowsy. Avoid driving, using machines, or doing anything else that could be dangerous if you are not alert. If you plan to participate in underwater sports, this medicine may cause disorienting effects. If this is a concern for you, talk with your doctor. · This medicine may make you sweat less and cause your body to get too hot. Be careful in hot weather, when you are exercising, or if using a sauna or whirlpool. · Tell any doctor or dentist who treats you that you are using this medicine. This medicine may affect certain medical test results. · Skin burns have been reported at the patch site in several patients wearing an aluminized transdermal system during a magnetic resonance imaging scan (MRI). Because Transderm Sc?p® contains aluminum, it is recommended to remove the system before undergoing an MRI.   Possible Side Effects While Using This Medicine:   Call your doctor right away if you notice any of these side effects:  · Allergic reaction: Itching or hives, swelling in your face or hands, swelling or tingling in your mouth or throat, chest tightness, trouble breathing  · Blurred vision. · Confusion or memory loss. · Fast, slow, or uneven heartbeat. · Lightheadedness, dizziness, drowsiness, or fainting. · Seeing, hearing, or feeling things that are not there. · Severe eye pain. · Trouble urinating. If you notice these less serious side effects, talk with your doctor:   · Dry mouth. · Dry, itchy, or red eyes. · Restlessness. · Skin rash or redness. If you notice other side effects that you think are caused by this medicine, tell your doctor. Call your doctor for medical advice about side effects. You may report side effects to FDA at 1-137-ZRS-4239  © 2017 [facility] Information is for End User's use only and may not be sold, redistributed or otherwise used for commercial purposes. The above information is an  only. It is not intended as medical advice for individual conditions or treatments. Talk to your doctor, nurse or pharmacist before following any medical regimen to see if it is safe and effective for you. DISCHARGE SUMMARY from Nurse    PATIENT INSTRUCTIONS:    After general anesthesia or intravenous sedation, for 24 hours or while taking prescription Narcotics:  · Limit your activities  · Do not drive and operate hazardous machinery  · Do not make important personal or business decisions  · Do  not drink alcoholic beverages  · If you have not urinated within 8 hours after discharge, please contact your surgeon on call. Report the following to your surgeon:  · Excessive pain, swelling, redness or odor of or around the surgical area  · Temperature over 100.5  · Nausea and vomiting lasting longer than 4 hours or if unable to take medications  · Any signs of decreased circulation or nerve impairment to extremity: change in color, persistent  numbness, tingling, coldness or increase pain  · Any questions    What to do at Home:  Recommended activity: See surgical instructions.     If you experience any of the following symptoms as noted above, please follow up with Dr. Chandana Celis. *  Please give a list of your current medications to your Primary Care Provider. *  Please update this list whenever your medications are discontinued, doses are      changed, or new medications (including over-the-counter products) are added. *  Please carry medication information at all times in case of emergency situations. These are general instructions for a healthy lifestyle:    No smoking/ No tobacco products/ Avoid exposure to second hand smoke  Surgeon General's Warning:  Quitting smoking now greatly reduces serious risk to your health. Obesity, smoking, and sedentary lifestyle greatly increases your risk for illness    A healthy diet, regular physical exercise & weight monitoring are important for maintaining a healthy lifestyle    You may be retaining fluid if you have a history of heart failure or if you experience any of the following symptoms:  Weight gain of 3 pounds or more overnight or 5 pounds in a week, increased swelling in our hands or feet or shortness of breath while lying flat in bed. Please call your doctor as soon as you notice any of these symptoms; do not wait until your next office visit. The discharge information has been reviewed with the caregiver. The caregiver verbalized understanding. Discharge medications reviewed with the caregiver and appropriate educational materials and side effects teaching were provided.   ___________________________________________________________________________________________________________________________________

## 2022-04-15 NOTE — INTERVAL H&P NOTE
Update History & Physical    The Patient's History and Physical of April 11, 2022 was reviewed with the patient and I examined the patient. There was no change  . The surgical site was confirmed by the patient and me. Plan:  The risk, benefits, expected outcome, and alternative to the recommended procedure have been discussed with the patient. Patient understands and wants to proceed with the procedure.     Electronically signed by Alex Osborne MD on 4/15/2022 at 1:01 PM

## 2022-04-15 NOTE — ANESTHESIA PREPROCEDURE EVALUATION
Anesthetic History     PONV          Review of Systems / Medical History  Patient summary reviewed, nursing notes reviewed and pertinent labs reviewed    Pulmonary                   Neuro/Psych              Cardiovascular    Hypertension                   GI/Hepatic/Renal                Endo/Other      Hypothyroidism       Other Findings              Physical Exam    Airway  Mallampati: I  TM Distance: 4 - 6 cm  Neck ROM: normal range of motion   Mouth opening: Normal     Cardiovascular    Rhythm: regular  Rate: normal         Dental  No notable dental hx       Pulmonary  Breath sounds clear to auscultation               Abdominal         Other Findings            Anesthetic Plan    ASA: 2  Anesthesia type: general and total IV anesthesia          Induction: Intravenous  Anesthetic plan and risks discussed with: Patient

## 2022-04-15 NOTE — ANESTHESIA POSTPROCEDURE EVALUATION
Post-Anesthesia Evaluation and Assessment    Patient: Ruddy Sacks MRN: [id number]  SSN: [id number]    YOB: 1965  Age: 64 y.o. Sex: female      I have evaluated the patient and they are stable and ready for discharge from the PACU. Cardiovascular Function/Vital Signs  Visit Vitals  /75   Pulse 79   Temp 36.4 °C (97.6 °F)   Resp 12   Wt 53.5 kg (118 lb)   SpO2 98%   BMI 22.30 kg/m²       Patient is status post General anesthesia for Procedure(s):  SCAR REVISION BILATERAL BREASTS, BREAST IMPLANT EXCHANGE. Nausea/Vomiting: None    Postoperative hydration reviewed and adequate. Pain:  Pain Scale 1: Numeric (0 - 10) (04/15/22 1134)  Pain Intensity 1: 0 (04/15/22 1134)   Managed    Neurological Status:   Neuro (WDL): Within Defined Limits (04/15/22 1134)   At baseline    Mental Status, Level of Consciousness: Alert and  oriented to person, place, and time    Pulmonary Status:   O2 Device: Nasal cannula (04/15/22 1612)   Adequate oxygenation and airway patent    Complications related to anesthesia: None    Post-anesthesia assessment completed.  No concerns    Signed By: Toshia Larsen MD     April 15, 2022

## 2022-04-15 NOTE — OP NOTES
[address]   OPERATIVE REPORT    Name:  Daniel Rosado  MR#:  [id number]  :  1965  ACCOUNT #:  [de-identified]  DATE OF SERVICE:  04/15/2022    PREOPERATIVE DIAGNOSES:  Personal history of previous cohesive silicone gel breast augmentation; previous mastopexy; recurrent/persistent breast asymmetry, volume and position; desires revision breast surgery. POSTOPERATIVE DIAGNOSES:  Personal history of previous cohesive silicone gel breast augmentation; previous mastopexy; recurrent/persistent breast asymmetry, volume and position; desires revision breast surgery. PROCEDURES PERFORMED:  Bilateral breast implant exchange and scar revision, repeat mastopexy bilateral breasts. SECONDARY DIAGNOSIS:  Asymmetry breast tissue, left greater than right. SECONDARY PROCEDURE:  Remove limited breast tissue, left upper and lateral breast.    SURGEON:  Darrin Acosta MD    ASSISTANTS:  Jose Hdez, Donette Sever, and [place]. STAFF:  OR staff, room #4, [facility] 7th floor. ANESTHESIA:  General.    COMPLICATIONS:  None. SPECIMENS REMOVED:  Skin right breast, discarded, not weighed, negligible. Skin left breast, not weighed, discarded, negligible. Breast tissue, left breast, not weighed, sent to Pathology for analysis. IMPLANTS:  New 375 mL device bilateral, previously described. ESTIMATED BLOOD LOSS:  Approximately 50 mL. IV FLUIDS:  Approximately 1000 mL. DRAINS:  None. FINDINGS:  Normal for age and history plus breast asymmetry volume, left greater than right. INDICATIONS FOR PROCEDURE:  The patient is a pleasant 55-year-old female who has had multiple elective breast procedures beginning 20 years ago with breast implants with saline submuscular implant. My first operation approximately 6 or 7 years ago was the implant exchange from saline to silicone.   Over the last couple of years, she had seen some soft tissue changes and laxity, excess of ptosis and was interested in implant exchange. She also had breast asymmetry and as a result of the left breast being larger than the right, a larger implant was placed on her right breast.  She was seen recently with some additional asymmetries, poor scar quality around the right nipple-areolar complex and left medial lower breast and had persistent asymmetry regarding shape and volume. She was interested in exchanging her current implants which consist of a 475 mL cohesive gel device on the right and a 385 mL gel device on the left. She is interested in placing identical saline breast implants and preoperatively selected a 375 mL smooth round moderate plus profile cohesive gel device combined with additional soft tissue rearrangement and excision, comes in today for this procedure. PROCEDURE:  After appropriate consent was obtained, preoperative markings were placed. She was taken to the operating room and placed on the operative table in supine position. Satisfactory general endotracheal anesthesia was obtained. SCD devices were placed per routine. A local anesthesia solution was injected around each breast to aid in postoperative pain management. Her chest was sterilely prepped and draped. We performed identical procedure bilaterally, initially making an incision within the meridian line with a #10 scalpel blade and the electrocautery was used to dissect through the tissues to the breast implant capsule. This was easily entered with the electrocautery and the old existing implants were removed (475 mL right, 385 mL left). At this point, new [place] reference number [id number] smooth round moderate plus profile 375 mL cohesive gel devices were placed in each pocket. On the right side was lot number [id number]. On the left side, it was lot number [id number]. Tailor tacking was performed on the skin markings based on preoperative markings to ensure incision placement prior to commitment.   Although the nipple-areolar complexes were reduced to 42 mm and additional skin was excised along Wise pattern scars, it was apparent that her persistent asymmetry was primarily the result of a greater amount of breast tissue on the left upper and left lateral breast.  We completed the implant exchange and closed the capsule with 3-0 Vicryl sutures. The central skin island was then de-epithelialized after she was then placed back in supine position and markings were reinforced and the staples were removed. This was performed with a #10 scalpel blade. All skin incisions of the scar revision and repeat mastopexy were closed in two layers with 3-0 Vicryl and 4-0 Monocryl and the periareolar defect on the right was closed in a similar fashion. On the left side prior to closure of the periareolar defect, breast tissue was directly excised from the upper medial, upper middle, and upper lateral aspects of the breast below the skin but above the nipple-areolar complex. This was sent to Pathology. This wound was also irrigated with Irrisept and then the periareolar defect on the left was closed in similar fashion with 3-0 Vicryl and 4-0 Monocryl suture. At the completion of the procedure, sponge and needle counts were reported as correct. She was awakened, extubated, and transferred to the recovery room in satisfactory and stable condition. She will be discharged home today in care of her family and follow up with me within 1-2 weeks.       Mini Liang MD      [id number]  D:  04/15/2022 16:19  T:  04/15/2022 19:14  JOB #:  [id number]  CC:  Cheikh Tobin MD

## 2022-06-30 ENCOUNTER — TRANSCRIBE ORDER (OUTPATIENT)
Dept: SCHEDULING | Age: 57
End: 2022-06-30

## 2022-06-30 DIAGNOSIS — M54.16 LUMBAR RADICULOPATHY: Primary | ICD-10-CM

## 2022-06-30 DIAGNOSIS — M79.18 DIFFUSE MYOFASCIAL PAIN SYNDROME: ICD-10-CM

## 2022-10-23 RX ORDER — OMEPRAZOLE 20 MG/1
CAPSULE, DELAYED RELEASE ORAL
Qty: 90 CAPSULE | Refills: 0 | OUTPATIENT
Start: 2022-10-23

## 2022-11-21 RX ORDER — MONTELUKAST SODIUM 10 MG/1
TABLET ORAL
Qty: 90 TABLET | Refills: 0 | Status: SHIPPED | OUTPATIENT
Start: 2022-11-21

## 2023-01-02 NOTE — H&P
Herve Goddard  : 1965  DOS: 2022    Chief Complaint: consultation       Allergies: Compazine       Medications: clonazePAM, FLUoxetine , Advil 200 mg oral tablet , Synthroid       Medical History: Height: 5' 2\", Weight: 120 lbs    Pulmonary System: allergies  Cardiac System: Negative  Blood and Liver Systems: Negative  Neurologic and Endocrine Systems: thyroid trouble  GI,  and Reproductive Systems: Negative  Cancer: Negative   Surgical History: scar revision breasts and implant removal and replacement ,  tip rhinoplasty and septoplasty with dr Stella Kumari ,  Cosmetic revision right capsulectomy, right donut mastopexy revision, left vertical mastopexy revision ,  Bilateral Breast implant removal and replacement ,  Cosmetic Unilateral Do-Nut Mastopexy(left) w Unilateral Implant Removal and Exchange(right) ,  partial hysterectomy (ovaries retained) ,  Septoplasty Revision ,  Breast Augmentation Revision ,  Abdominoplasty ,  Breast Implant Revision ,  Septoplasty ,   Breast Augmentation       Family History: Heart Disease Father, High Blood Pressure Mother, Cancer Father       Social History: Smoking Status Never smoker    Alcohol Use Drinks per week: 4-5     Pregnancy Number of pregnancies:  1    Number of children:0          Ms. Agueda Abraham is now 6 months out from the last revision breast procedure, and she currently has a 375-cc moderate plus profile smooth round cohesive gel device bilaterally. She has some fullness and asymmetry above the left nipple areolar complex where a postoperative serous fluid collection was drained and had a scar revision procedure in the office as well. What she is now experiencing is implant malposition on the right in a lower/lateral position. She remains happy however with the breast volume. Review of systems reveals normal heart and lung sounds. Examination of her breasts reveals well-healed wise pattern scars that are hypertrophic, thickened, and vascular appearing.   She has a subtle contour issue at the 12 o'clock position above the left nipple areolar complex, consistent with the serous fluid issue that was dealt with. On the right side her breast implant has bottomed out at least 1-2 cm and falls out laterally on her chest wall. The right nipple areolar complexes also widened and is around 50 mm pulling in an oblique lower lateral direction, and the left nipple areolar complex is 45 mm. Again, we are having a discussion regarding revision breast surgery, and she clearly needs capsulorrhaphy sutures on the right to re-establish the appropriate position of the inframammary fold and the implant. The quality of her scars remains vascular and thickened and irregular. This revision procedure would be under a general anesthetic on an outpatient basis. Our goals would be to address the position of the implant on the right. Any incisions are closed in layers with dissolvable sutures and a compression garment is used with associated activity restrictions while she is healing which can extend out 6 weeks. The risks and complications include but are not limited to infection, pain, bleeding, hematomas requiring re-operation, skin sensitivity changes, vascular compromise to tissues resulting in partial or complete loss of tissues, resultant open wounds, the need for long term wound care and dressing changes and scarring, irregularities and asymmetries requiring touch-up and revision surgery, an unacceptable cosmetic result, and other things including cardiac and pulmonary risks that include death. The same implant specific risks occur as with all previous procedures. The patient was counseled about the risks of gopi Covid-19 during their perioperative period and any recovery window from their procedure. The patient was made aware that gopi Covid-19 may worsen their prognosis for recovering from their procedure and lend to a higher morbidity and/or mortality risk. All material risks, benefits, and reasonable alternatives including postponing the procedure were discussed. The patient DOES wish to proceed with their procedure at this time. Elodia Christine M.D.  FACS

## 2023-01-03 ENCOUNTER — HOSPITAL ENCOUNTER (OUTPATIENT)
Age: 58
Setting detail: OUTPATIENT SURGERY
Discharge: HOME | End: 2023-01-03
Attending: SURGERY | Admitting: SURGERY
Payer: SELF-PAY

## 2023-01-03 ENCOUNTER — ANESTHESIA EVENT (OUTPATIENT)
Dept: MEDSURG UNIT | Age: 58
End: 2023-01-03
Payer: SELF-PAY

## 2023-01-03 ENCOUNTER — ANESTHESIA (OUTPATIENT)
Dept: MEDSURG UNIT | Age: 58
End: 2023-01-03
Payer: SELF-PAY

## 2023-01-03 VITALS
WEIGHT: 126.32 LBS | TEMPERATURE: 97.8 F | BODY MASS INDEX: 23.25 KG/M2 | RESPIRATION RATE: 16 BRPM | OXYGEN SATURATION: 96 % | HEART RATE: 68 BPM | SYSTOLIC BLOOD PRESSURE: 102 MMHG | DIASTOLIC BLOOD PRESSURE: 58 MMHG | HEIGHT: 62 IN

## 2023-01-03 PROCEDURE — 76030000003 HC AMB SURG OR TIME 1.5 TO 2: Performed by: SURGERY

## 2023-01-03 PROCEDURE — 77030002933 HC SUT MCRYL J&J -A: Performed by: SURGERY

## 2023-01-03 PROCEDURE — 2709999900 HC NON-CHARGEABLE SUPPLY: Performed by: SURGERY

## 2023-01-03 PROCEDURE — 77030031139 HC SUT VCRL2 J&J -A: Performed by: SURGERY

## 2023-01-03 PROCEDURE — 74011000250 HC RX REV CODE- 250: Performed by: SURGERY

## 2023-01-03 PROCEDURE — 77030040361 HC SLV COMPR DVT MDII -B: Performed by: SURGERY

## 2023-01-03 PROCEDURE — 74011000250 HC RX REV CODE- 250: Performed by: NURSE ANESTHETIST, CERTIFIED REGISTERED

## 2023-01-03 PROCEDURE — 74011250636 HC RX REV CODE- 250/636: Performed by: SURGERY

## 2023-01-03 PROCEDURE — 77030041680 HC PNCL ELECSURG SMK EVAC CNMD -B: Performed by: SURGERY

## 2023-01-03 PROCEDURE — 76060000063 HC AMB SURG ANES 1.5 TO 2 HR: Performed by: SURGERY

## 2023-01-03 PROCEDURE — 76210000036 HC AMBSU PH I REC 1.5 TO 2 HR: Performed by: SURGERY

## 2023-01-03 PROCEDURE — 74011250636 HC RX REV CODE- 250/636: Performed by: NURSE ANESTHETIST, CERTIFIED REGISTERED

## 2023-01-03 PROCEDURE — 74011250637 HC RX REV CODE- 250/637: Performed by: ANESTHESIOLOGY

## 2023-01-03 PROCEDURE — 77030038692 HC WND DEB SYS IRMX -B: Performed by: SURGERY

## 2023-01-03 PROCEDURE — 77030008684 HC TU ET CUF COVD -B: Performed by: ANESTHESIOLOGY

## 2023-01-03 RX ORDER — SODIUM CHLORIDE, SODIUM LACTATE, POTASSIUM CHLORIDE, CALCIUM CHLORIDE 600; 310; 30; 20 MG/100ML; MG/100ML; MG/100ML; MG/100ML
INJECTION, SOLUTION INTRAVENOUS
Status: DISCONTINUED | OUTPATIENT
Start: 2023-01-03 | End: 2023-01-03 | Stop reason: HOSPADM

## 2023-01-03 RX ORDER — ROCURONIUM BROMIDE 10 MG/ML
INJECTION, SOLUTION INTRAVENOUS AS NEEDED
Status: DISCONTINUED | OUTPATIENT
Start: 2023-01-03 | End: 2023-01-03 | Stop reason: HOSPADM

## 2023-01-03 RX ORDER — SODIUM CHLORIDE 0.9 % (FLUSH) 0.9 %
5-40 SYRINGE (ML) INJECTION AS NEEDED
Status: DISCONTINUED | OUTPATIENT
Start: 2023-01-03 | End: 2023-01-03 | Stop reason: HOSPADM

## 2023-01-03 RX ORDER — FENTANYL CITRATE 50 UG/ML
25 INJECTION, SOLUTION INTRAMUSCULAR; INTRAVENOUS
Status: DISCONTINUED | OUTPATIENT
Start: 2023-01-03 | End: 2023-01-03 | Stop reason: HOSPADM

## 2023-01-03 RX ORDER — FENTANYL CITRATE 50 UG/ML
INJECTION, SOLUTION INTRAMUSCULAR; INTRAVENOUS AS NEEDED
Status: DISCONTINUED | OUTPATIENT
Start: 2023-01-03 | End: 2023-01-03 | Stop reason: HOSPADM

## 2023-01-03 RX ORDER — SODIUM CHLORIDE, SODIUM LACTATE, POTASSIUM CHLORIDE, CALCIUM CHLORIDE 600; 310; 30; 20 MG/100ML; MG/100ML; MG/100ML; MG/100ML
50 INJECTION, SOLUTION INTRAVENOUS CONTINUOUS
Status: DISCONTINUED | OUTPATIENT
Start: 2023-01-03 | End: 2023-01-03 | Stop reason: HOSPADM

## 2023-01-03 RX ORDER — LIDOCAINE HYDROCHLORIDE 20 MG/ML
INJECTION, SOLUTION EPIDURAL; INFILTRATION; INTRACAUDAL; PERINEURAL AS NEEDED
Status: DISCONTINUED | OUTPATIENT
Start: 2023-01-03 | End: 2023-01-03 | Stop reason: HOSPADM

## 2023-01-03 RX ORDER — KETAMINE HYDROCHLORIDE 10 MG/ML
INJECTION, SOLUTION INTRAMUSCULAR; INTRAVENOUS AS NEEDED
Status: DISCONTINUED | OUTPATIENT
Start: 2023-01-03 | End: 2023-01-03 | Stop reason: HOSPADM

## 2023-01-03 RX ORDER — DEXMEDETOMIDINE HYDROCHLORIDE 100 UG/ML
INJECTION, SOLUTION INTRAVENOUS AS NEEDED
Status: DISCONTINUED | OUTPATIENT
Start: 2023-01-03 | End: 2023-01-03 | Stop reason: HOSPADM

## 2023-01-03 RX ORDER — PROPOFOL 10 MG/ML
INJECTION, EMULSION INTRAVENOUS
Status: DISCONTINUED | OUTPATIENT
Start: 2023-01-03 | End: 2023-01-03 | Stop reason: HOSPADM

## 2023-01-03 RX ORDER — MIDAZOLAM HYDROCHLORIDE 1 MG/ML
INJECTION, SOLUTION INTRAMUSCULAR; INTRAVENOUS AS NEEDED
Status: DISCONTINUED | OUTPATIENT
Start: 2023-01-03 | End: 2023-01-03 | Stop reason: HOSPADM

## 2023-01-03 RX ORDER — SODIUM CHLORIDE 0.9 % (FLUSH) 0.9 %
5-40 SYRINGE (ML) INJECTION EVERY 8 HOURS
Status: DISCONTINUED | OUTPATIENT
Start: 2023-01-03 | End: 2023-01-03 | Stop reason: HOSPADM

## 2023-01-03 RX ORDER — PROPOFOL 10 MG/ML
INJECTION, EMULSION INTRAVENOUS AS NEEDED
Status: DISCONTINUED | OUTPATIENT
Start: 2023-01-03 | End: 2023-01-03 | Stop reason: HOSPADM

## 2023-01-03 RX ORDER — SCOLOPAMINE TRANSDERMAL SYSTEM 1 MG/1
1 PATCH, EXTENDED RELEASE TRANSDERMAL ONCE
Status: DISCONTINUED | OUTPATIENT
Start: 2023-01-03 | End: 2023-01-03 | Stop reason: HOSPADM

## 2023-01-03 RX ORDER — MIDAZOLAM HYDROCHLORIDE 1 MG/ML
0.5 INJECTION, SOLUTION INTRAMUSCULAR; INTRAVENOUS
Status: DISCONTINUED | OUTPATIENT
Start: 2023-01-03 | End: 2023-01-03 | Stop reason: HOSPADM

## 2023-01-03 RX ORDER — LIDOCAINE HYDROCHLORIDE 10 MG/ML
0.1 INJECTION, SOLUTION EPIDURAL; INFILTRATION; INTRACAUDAL; PERINEURAL AS NEEDED
Status: DISCONTINUED | OUTPATIENT
Start: 2023-01-03 | End: 2023-01-03 | Stop reason: HOSPADM

## 2023-01-03 RX ORDER — HYDROMORPHONE HYDROCHLORIDE 2 MG/ML
INJECTION, SOLUTION INTRAMUSCULAR; INTRAVENOUS; SUBCUTANEOUS AS NEEDED
Status: DISCONTINUED | OUTPATIENT
Start: 2023-01-03 | End: 2023-01-03 | Stop reason: HOSPADM

## 2023-01-03 RX ADMIN — PROPOFOL 125 MCG/KG/MIN: 10 INJECTION, EMULSION INTRAVENOUS at 12:10

## 2023-01-03 RX ADMIN — HYDROMORPHONE HYDROCHLORIDE 0.5 MG: 2 INJECTION, SOLUTION INTRAMUSCULAR; INTRAVENOUS; SUBCUTANEOUS at 13:24

## 2023-01-03 RX ADMIN — Medication 25 MG: at 12:06

## 2023-01-03 RX ADMIN — DEXMEDETOMIDINE HYDROCHLORIDE 10 MCG: 100 INJECTION, SOLUTION, CONCENTRATE INTRAVENOUS at 12:41

## 2023-01-03 RX ADMIN — FENTANYL CITRATE 50 MCG: 0.05 INJECTION, SOLUTION INTRAMUSCULAR; INTRAVENOUS at 13:14

## 2023-01-03 RX ADMIN — LIDOCAINE HYDROCHLORIDE 40 MG: 20 INJECTION, SOLUTION EPIDURAL; INFILTRATION; INTRACAUDAL; PERINEURAL at 12:06

## 2023-01-03 RX ADMIN — FENTANYL CITRATE 50 MCG: 0.05 INJECTION, SOLUTION INTRAMUSCULAR; INTRAVENOUS at 12:27

## 2023-01-03 RX ADMIN — SODIUM CHLORIDE, POTASSIUM CHLORIDE, SODIUM LACTATE AND CALCIUM CHLORIDE: 600; 310; 30; 20 INJECTION, SOLUTION INTRAVENOUS at 11:40

## 2023-01-03 RX ADMIN — ROCURONIUM BROMIDE 30 MG: 10 SOLUTION INTRAVENOUS at 13:14

## 2023-01-03 RX ADMIN — WATER 2 G: 1 INJECTION INTRAMUSCULAR; INTRAVENOUS; SUBCUTANEOUS at 12:18

## 2023-01-03 RX ADMIN — MIDAZOLAM 4 MG: 1 INJECTION INTRAMUSCULAR; INTRAVENOUS at 11:57

## 2023-01-03 RX ADMIN — DEXMEDETOMIDINE HYDROCHLORIDE 10 MCG: 100 INJECTION, SOLUTION, CONCENTRATE INTRAVENOUS at 12:32

## 2023-01-03 RX ADMIN — SODIUM CHLORIDE, POTASSIUM CHLORIDE, SODIUM LACTATE AND CALCIUM CHLORIDE: 600; 310; 30; 20 INJECTION, SOLUTION INTRAVENOUS at 13:15

## 2023-01-03 RX ADMIN — PROPOFOL 200 MG: 10 INJECTION, EMULSION INTRAVENOUS at 12:06

## 2023-01-03 RX ADMIN — HYDROMORPHONE HYDROCHLORIDE 0.5 MG: 2 INJECTION, SOLUTION INTRAMUSCULAR; INTRAVENOUS; SUBCUTANEOUS at 12:45

## 2023-01-03 RX ADMIN — ROCURONIUM BROMIDE 20 MG: 10 SOLUTION INTRAVENOUS at 12:49

## 2023-01-03 RX ADMIN — PROPOFOL 100 MG: 10 INJECTION, EMULSION INTRAVENOUS at 12:47

## 2023-01-03 RX ADMIN — SUGAMMADEX 200 MG: 100 INJECTION, SOLUTION INTRAVENOUS at 13:45

## 2023-01-03 RX ADMIN — Medication 25 MG: at 13:32

## 2023-01-03 RX ADMIN — ROCURONIUM BROMIDE 50 MG: 10 SOLUTION INTRAVENOUS at 12:06

## 2023-01-03 RX ADMIN — FENTANYL CITRATE 100 MCG: 0.05 INJECTION, SOLUTION INTRAMUSCULAR; INTRAVENOUS at 12:06

## 2023-01-03 RX ADMIN — FENTANYL CITRATE 50 MCG: 0.05 INJECTION, SOLUTION INTRAMUSCULAR; INTRAVENOUS at 11:57

## 2023-01-03 NOTE — ANESTHESIA PREPROCEDURE EVALUATION
Anesthetic History     PONV          Review of Systems / Medical History  Patient summary reviewed, nursing notes reviewed and pertinent labs reviewed    Pulmonary                   Neuro/Psych              Cardiovascular    Hypertension                   GI/Hepatic/Renal                Endo/Other      Hypothyroidism       Other Findings              Physical Exam    Airway  Mallampati: I  TM Distance: 4 - 6 cm  Neck ROM: normal range of motion   Mouth opening: Normal     Cardiovascular    Rhythm: regular  Rate: normal         Dental  No notable dental hx       Pulmonary  Breath sounds clear to auscultation               Abdominal         Other Findings            Anesthetic Plan    ASA: 2  Anesthesia type: general          Induction: Intravenous  Anesthetic plan and risks discussed with: Patient

## 2023-01-03 NOTE — OP NOTES
[address]   OPERATIVE REPORT    Name:  Nannette Renee  MR#:  [id number]  :  1965  ACCOUNT #:  [de-identified]  DATE OF SERVICE:  2023    PREOPERATIVE DIAGNOSES:  Personal history of previous breast augmentation mastopexy, delayed postoperative breast asymmetry, desires revision surgery. POSTOPERATIVE DIAGNOSES:  Personal history of previous breast augmentation mastopexy, delayed postoperative breast asymmetry, desires revision surgery. PROCEDURES PERFORMED:  Revision breast augmentation right breast and scar revision left breast.    SURGEON:  Santiago Carroll MD    ASSISTANTS:  Flako Jason. STAFF:  OF staff, room #2, [facility], 7th floor. ANESTHESIA:  General.    COMPLICATIONS:  None. SPECIMENS REMOVED:  None (skin and scar of bilateral breasts, negligible, not weighed, discarded). IMPLANTS:  None. ESTIMATED BLOOD LOSS:  Approximately 25 mL. IV FLUIDS:  1300 mL. DRAINS:  None. FINDINGS:  Normal for age and history. INDICATIONS:  The patient is a pleasant 59-year-old female who had previously undergone elective breast augmentation mastopexy approximately 20 years ago by a separate plastic surgeon. She started to develop asymmetry, capsular contracture, and visible contour defects and was eventually seen in [facility]. She has had multiple operations including implant exchange from different volumes to symmetric 375 mL moderate plus profile smooth round cohesive gel devices in the submuscular plane and has had repeat mastopexy procedures. Her last operation was approximately 1 year ago and she was seen recently with some development of asymmetry with recurrent laxity and bottoming out on the right and wide scars and loss of upper pole fullness.   On the left side, she was found to have atypical scars around the left nipple-areolar complex with widening and soft tissue loss and a flattened defect from the 12 o'clock to 3 o'clock position and a puckering irregularity along the inframammary fold from the 6 o'clock position laterally towards the chest wall. She comes in today for these combined revision procedures. PROCEDURE:  After appropriate consent was obtained, preoperative markings were placed. She was taken to the operating room, placed on the operating table in supine position. Satisfactory general endotracheal anesthesia was obtained. SCD devices were placed per routine. A local anesthesia solution was injected based on our markings and her chest was sterilely prepped and draped. The first thing we did was tailor tacking the surgical staples on the right based on preoperative markings and the patient was sat upright to inspect symmetry and projection. There were some adjustments made in the markings, and she was then placed back in supine position and the staples were removed. The nipple-areolar complex was reduced to 45 mm with a cookie cutter and all skin incisions were made with a #10 scalpel blade. There was no undermining in any of the skin flaps and the capsulorrhaphy sutures were placed inferolaterally to help transpose the breast implant in a more superior and medial orientation with a 3-0 PDS on an SH needle. All skin incisions after being irrigated with Irrisept irrigation solution and sterile saline were closed in two layers with 3-0 Vicryl and 4-0 Monocryl suture. The incisions were made on the left as well based on the previously-described asymmetries and irregularities. There was no undermining on the left side as well, and all wounds were closed in two layers with 3-0 Vicryl and 4-0 Monocryl. Xeroform gauze, 4x4 gauze, and a surgical bra were placed. At the end of bilateral procedures, sponge and needle counts were reported as correct. She was awakened, extubated, and transferred to the recovery room in satisfactory and stable condition.   She will be discharged home today in care of her family with prescriptions and instructions and will follow up with me within 2 weeks.       Gina Figueroa MD      [id number]  D:  01/03/2023 14:07  T:  01/03/2023 14:53  JOB #:  [id number]  CC:  Yosef Cedillo MD

## 2023-01-03 NOTE — ROUTINE PROCESS
Patient: Najma Amin MRN: [id number]  SSN: [id number]   YOB: 1965  Age: 62 y.o. Sex: female     Patient is status post Procedure(s):  REVISION RIGHT BREAST AUGMENTATION, SCAR REVISION, LEFT NIPPLE AREOLAR COMPLEX. Surgeon(s) and Role:     * Kevin Virgen MD - Primary    Local/Dose/Irrigation:  SEE MAR                  Peripheral IV 01/03/23 Left;Posterior Hand (Active)   Site Assessment Clean, dry, & intact 01/03/23 1130   Phlebitis Assessment 0 01/03/23 1130   Infiltration Assessment 0 01/03/23 1130   Dressing Status Clean, dry, & intact 01/03/23 1130   Dressing Type Transparent 01/03/23 1130   Hub Color/Line Status Blue; Infusing 01/03/23 1130            Airway - Endotracheal Tube 01/03/23 Oral (Active)                   Dressing/Packing:  Incision 01/03/23 Breast-Dressing/Treatment: Gauze dressing/dressing sponge;Surgical bra;Xeroform (01/03/23 3828)    Splint/Cast:  ]    Other:

## 2023-01-03 NOTE — PERIOP NOTES
Patient has used scopolamine patch with previous surgeries and is aware of use, and proper way to discard.

## 2023-01-03 NOTE — DISCHARGE INSTRUCTIONS
Leave the surgical garment and dressings ON and DRY for 48 hours/2 days, then may remove for first shower. Resume any important pre op medications and diet but use sport drinks like Gatorade or Power aid instead of water for 2-3 days and extra protein in diet helps wounds heal.  Keep head elevated at night when sleeping about 30 degrees, do not lay flat for about 2 weeks  NO strenuous activity or exercise for 4 weeks  Walk every 1-2 hours during the day in house while awake for 5-100 minutes during the first 2 weeks  Use stool softeners to prevent constipation  Do not take pain medications on an empty stomach  When you shower, remove the surgical garment, discard any loose gauze, shower like you normally would (no baths) place antibiotic ointment of choice over all incisions with clean gauze, and place the second garment on like the first one. You may now repeat daily or every other day based on preference. Call DR. Dewey Later at [phone] (cell) for questions  Anticipate a phone call from Dr. Faisal Lofton from Nurse    PATIENT INSTRUCTIONS:    After general anesthesia or intravenous sedation, for 24 hours or while taking prescription Narcotics:  Limit your activities  Do not drive and operate hazardous machinery  Do not make important personal or business decisions  Do  not drink alcoholic beverages  If you have not urinated within 8 hours after discharge, please contact your surgeon on call.     Report the following to your surgeon:  Excessive pain, swelling, redness or odor of or around the surgical area  Temperature over 100.5  Nausea and vomiting lasting longer than 4 hours or if unable to take medications  Any signs of decreased circulation or nerve impairment to extremity: change in color, persistent  numbness, tingling, coldness or increase pain  Any questions    POST ANESTHESIA INSTRUCTIONS  What to do at Home:  Recommended activity: See surgical instructions,     If you have any concerns, please follow up with Dr Trung Martinez    *  Please give a list of your current medications to your Primary Care Provider. *  Please update this list whenever your medications are discontinued, doses are      changed, or new medications (including over-the-counter products) are added. *  Please carry medication information at all times in case of emergency situations. These are general instructions for a healthy lifestyle:    No smoking/ No tobacco products/ Avoid exposure to second hand smoke  Surgeon General's Warning:  Quitting smoking now greatly reduces serious risk to your health. Obesity, smoking, and sedentary lifestyle greatly increases your risk for illness    A healthy diet, regular physical exercise & weight monitoring are important for maintaining a healthy lifestyle    You may be retaining fluid if you have a history of heart failure or if you experience any of the following symptoms:  Weight gain of 3 pounds or more overnight or 5 pounds in a week, increased swelling in our hands or feet or shortness of breath while lying flat in bed. Please call your doctor as soon as you notice any of these symptoms; do not wait until your next office visit. The discharge information has been reviewed with the patient and caregiver. The patient and caregiver verbalized understanding. Discharge medications reviewed with the caregiver and appropriate educational materials and side effects teaching were provided.   ___________________________________________________________________________________________________________________________________

## 2023-01-03 NOTE — BRIEF OP NOTE
Brief Postoperative Note    Patient: Cristi Richardson  YOB: 1965  MRN: [id number]    Date of Procedure: 1/3/2023     Pre-Op Diagnosis: COSMETIC    Post-Op Diagnosis: Same as preoperative diagnosis.       Procedure(s):  REVISION RIGHT BREAST AUGMENTATION, SCAR REVISION, LEFT NIPPLE AREOLAR COMPLEX    Surgeon(s):  Barry Tobin MD    Surgical Assistant: Surg Asst-1: Shaun Cole A    Anesthesia: General     Estimated Blood Loss (mL): less than 50     Complications: None    Specimens: * No specimens in log *     Implants: * No implants in log *    Drains: * No LDAs found *    Findings: normal for age and history    Electronically Signed by Jose Oliver MD on 1/3/2023 at 1:54 PM

## 2023-01-03 NOTE — ANESTHESIA POSTPROCEDURE EVALUATION
Post-Anesthesia Evaluation and Assessment    Patient: Edson Ureña MRN: [id number]  SSN: [id number]    YOB: 1965  Age: 62 y.o. Sex: female      I have evaluated the patient and they are stable and ready for discharge from the PACU. Cardiovascular Function/Vital Signs  Visit Vitals  BP (!) 87/57   Pulse 69   Temp 36.6 °C (97.8 °F)   Resp 17   Ht 5' 2\" (1.575 m)   Wt 57.3 kg (126 lb 5.2 oz)   SpO2 97%   BMI 23.10 kg/m²       Patient is status post General anesthesia for Procedure(s):  REVISION RIGHT BREAST AUGMENTATION, SCAR REVISION, LEFT NIPPLE AREOLAR COMPLEX. Nausea/Vomiting: None    Postoperative hydration reviewed and adequate. Pain:  Pain Scale 1: Numeric (0 - 10) (01/03/23 1402)  Pain Intensity 1: 0 (01/03/23 1402)   Managed    Neurological Status:   Neuro (WDL): Exceptions to WDL (01/03/23 1402)  Neuro  Neurologic State: Drowsy; Eyes open spontaneously (01/03/23 1402)  LUE Motor Response: Purposeful (01/03/23 1402)  LLE Motor Response: Purposeful (01/03/23 1402)  RUE Motor Response: Purposeful (01/03/23 1402)  RLE Motor Response: Purposeful (01/03/23 1402)   At baseline    Mental Status, Level of Consciousness: Alert and  oriented to person, place, and time    Pulmonary Status:   O2 Device: Nasal cannula (01/03/23 1402)   Adequate oxygenation and airway patent    Complications related to anesthesia: None    Post-anesthesia assessment completed. No concerns    Signed By: Marie Bullock MD     January 3, 2023              Procedure(s):  REVISION RIGHT BREAST AUGMENTATION, SCAR REVISION, LEFT NIPPLE AREOLAR COMPLEX.    general    <[email or web address]>    INITIAL Post-op Vital signs:   Vitals Value Taken Time   BP 88/52 01/03/23 1430   Temp 36.6 °C (97.8 °F) 01/03/23 1402   Pulse 63 01/03/23 1435   Resp 10 01/03/23 1435   SpO2 94 % 01/03/23 1435   Vitals shown include unvalidated device data.

## 2023-01-03 NOTE — INTERVAL H&P NOTE
Update History & Physical    The Patient's History and Physical of December 20, 2022 was reviewed with the patient and I examined the patient. There was no change. The surgical site was confirmed by the patient and me. Plan:  The risk, benefits, expected outcome, and alternative to the recommended procedure have been discussed with the patient. Patient understands and wants to proceed with the procedure.     Electronically signed by Krystin Molina MD on 1/3/2023 at 11:38 AM

## 2023-04-21 DIAGNOSIS — M79.18 DIFFUSE MYOFASCIAL PAIN SYNDROME: ICD-10-CM

## 2023-04-21 DIAGNOSIS — M54.16 LUMBAR RADICULOPATHY: Primary | ICD-10-CM

## 2024-05-21 NOTE — OP NOTES
[Bone Marrow Biopsy] : bone marrow biopsy dictated [Bone Marrow Aspiration] : bone marrow aspiration  [Patient] : the patient [Verbal Consent Obtained] : verbal consent was obtained prior to the procedure [Patient identification verified] : patient identification verified [Procedure verified and consent obtained] : procedure verified and consent obtained [Laterality verified and correct site marked] : laterality verified and correct site marked [Right] : site: right [Correct positioning] : correct positioning [Prone] : prone [Superior iliac spine was identified] : the superior iliac spine was identified. [The right posterior iliac crest was prepped with betadine and draped, using sterile technique.] : The right posterior iliac crest was prepped with betadine and draped, using sterile technique. [Lidocaine was injected and into the periosteum overlying the site.] : Lidocaine was injected and into the periosteum overlying the site. [Aspirate] : aspirate [Cytogenetics] : cytogenetics [FISH] : FISH [Biopsy] : biopsy [Flow Cytometry] : flow cytometry [] : The patient was instructed to remove the bandage the following AM. The patient may bathe. Acetaminophen may be taken for discomfort, as per package directions.If there are any other problems, the patient was instructed to call the office. The patient verbalized understanding, and is aware of the office contact numbers. 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 R/o MM [FreeTextEntry2] : 7 cc of 1% Lidocaine was used for the procedure   WBC:12.87 K/ul Hgb: 11.3 g/dL Hct: 37.1 % Plts: 295 K/uL   Bone marrow aspiration and biopsy were done. Iron stain, red congo stain, MM panel requested. Discussed the need to obtain consent in the future for biopsy specimen to be sent to [facility] if biopsy result + for MM. Patient verbalized understanding and agreed with the plan.  Pressure applied > 10 mins - no S&S of bleeding noted.

## 2025-07-11 ENCOUNTER — TRANSCRIBE ORDERS (OUTPATIENT)
Facility: HOSPITAL | Age: 60
End: 2025-07-11

## 2025-07-11 DIAGNOSIS — M54.12 CERVICAL RADICULOPATHY: Primary | ICD-10-CM

## (undated) DEVICE — (D)STRIP SKN CLSR 0.5X4IN WHT --

## (undated) DEVICE — SPONGE GZ W4XL4IN COT 12 PLY TYP VII WVN C FLD DSGN

## (undated) DEVICE — SYSTEM IMPL DEL FOR BRST IMPL FUN (SEE COMMENT)

## (undated) DEVICE — SLIM BODY SKIN STAPLER: Brand: APPOSE ULC

## (undated) DEVICE — INTENDED FOR TISSUE SEPARATION, AND OTHER PROCEDURES THAT REQUIRE A SHARP SURGICAL BLADE TO PUNCTURE OR CUT.: Brand: BARD-PARKER ® CARBON RIB-BACK BLADES

## (undated) DEVICE — Device

## (undated) DEVICE — GARMENT,MEDLINE,DVT,INT,CALF,MED, GEN2: Brand: MEDLINE

## (undated) DEVICE — SUTURE MCRYL SZ 4-0 L18IN ABSRB UD L19MM PS-2 3/8 CIR PRIM Y496G

## (undated) DEVICE — SUTURE MCRYL SZ 4-0 L27IN ABSRB UD L19MM PS-2 1/2 CIR PRIM Y426H

## (undated) DEVICE — SURGICAL PROCEDURE PACK BASIN MAJ SET CUST NO CAUT

## (undated) DEVICE — SUTURE VCRL SZ 3-0 L27IN ABSRB UD L24MM FS-1 3/8 CIR REV J442H

## (undated) DEVICE — DRAPE,REIN 53X77,STERILE: Brand: MEDLINE

## (undated) DEVICE — SOLUTION IV 1000ML 0.9% SOD CHL

## (undated) DEVICE — SPONGE LAP 18X18IN STRL -- 5/PK

## (undated) DEVICE — KENDALL SCD EXPRESS SLEEVES, KNEE LENGTH, MEDIUM: Brand: KENDALL SCD

## (undated) DEVICE — GAUZE SPONGES,12 PLY: Brand: CURITY

## (undated) DEVICE — NEEDLE HYPO 22GA L1.5IN BLK S STL HUB POLYPR SHLD REG BVL

## (undated) DEVICE — TRAY PREP DRY W/ PREM GLV 2 APPL 6 SPNG 2 UNDPD 1 OVERWRAP

## (undated) DEVICE — SOLUTION IRRIG 1000ML 0.9% SOD CHL USP POUR PLAS BTL

## (undated) DEVICE — SYR LR LCK 1ML GRAD NSAF 30ML --

## (undated) DEVICE — MASTISOL ADHESIVE LIQ 2/3ML

## (undated) DEVICE — BLADE ELECTRODE: Brand: VALLEYLAB

## (undated) DEVICE — 450 ML BOTTLE OF 0.05% CHLORHEXIDINE GLUCONATE IN 99.95% STERILE WATER FOR IRRIGATION, USP AND APPLICATOR.: Brand: IRRISEPT ANTIMICROBIAL WOUND LAVAGE

## (undated) DEVICE — GLOVE ORANGE PI 7 1/2   MSG9075

## (undated) DEVICE — OCCLUSIVE GAUZE STRIP,3% BISMUTH TRIBROMOPHENATE IN PETROLATUM BLEND: Brand: XEROFORM

## (undated) DEVICE — INTENDED USE FOR SURGICAL MARKING ON INTACT SKIN, ALSO PROVIDES A PERMANENT METHOD OF IDENTIFYING OBJECTS IN THE OPERATING ROOM: Brand: WRITESITE® REGULAR TIP SKIN MARKER

## (undated) DEVICE — SUTURE VCRL SZ 3-0 L27IN ABSRB UD L24MM PS-1 3/8 CIR PRIM J936H

## (undated) DEVICE — ROCKER SWITCH PENCIL BLADE ELECTRODE, HOLSTER: Brand: EDGE

## (undated) DEVICE — NEEDLE SPNL 22GA L3.5IN BLK HUB S STL REG WALL FIT STYL W/

## (undated) DEVICE — SYR 10ML CTRL LR LCK NSAF LF --

## (undated) DEVICE — DRESSING,GAUZE,XEROFORM,CURAD,5"X9",ST: Brand: CURAD

## (undated) DEVICE — PREMIUM WET SKIN PREP TRAY: Brand: MEDLINE INDUSTRIES, INC.

## (undated) DEVICE — SUT PROL 3-0 36IN SH DA BLU --

## (undated) DEVICE — INFECTION CONTROL KIT SYS

## (undated) DEVICE — SUTURE ETHLN SZ 6-0 L18IN NONABSORBABLE BLK PC-3 L16MM 3/8 1866G

## (undated) DEVICE — X-RAY SPONGES,16 PLY: Brand: DERMACEA

## (undated) DEVICE — Z DISCONTINUED NEEDLE HYPO 22GA L1.5IN BLK POLYPR HUB S STL REG BVL STR - SEE COMMENT

## (undated) DEVICE — 1200 GUARD II KIT W/5MM TUBE W/O VAC TUBE: Brand: GUARDIAN

## (undated) DEVICE — TOWEL SURG W17XL27IN STD BLU COT NONFENESTRATED PREWASHED

## (undated) DEVICE — SUTURE PDS II SZ 3-0 L27IN ABSRB CLR SH L26MM 1/2 CIR TAPR Z416H

## (undated) DEVICE — TOTAL TRAY, DB, 100% SILI FOLEY, 16FR 10: Brand: MEDLINE

## (undated) DEVICE — GRADUATED BOWL: Brand: DEVON

## (undated) DEVICE — SUTURE PDS II SZ 3-0 L27IN ABSRB VLT L26MM SH 1/2 CIR Z316H

## (undated) DEVICE — SUTURE MCRYL SZ 3-0 L27IN ABSRB UD L24MM PS-1 3/8 CIR PRIM Y936H

## (undated) DEVICE — NEEDLE HYPO 18GA L1.5IN PNK S STL HUB POLYPR SHLD REG BVL

## (undated) DEVICE — STERILE POLYISOPRENE POWDER-FREE SURGICAL GLOVES: Brand: PROTEXIS

## (undated) DEVICE — REM POLYHESIVE ADULT PATIENT RETURN ELECTRODE: Brand: VALLEYLAB

## (undated) DEVICE — SUT MERS 3-0 18IN FS1 WHT --

## (undated) DEVICE — STRIP,CLOSURE,WOUND,MEDI-STRIP,1/2X4: Brand: MEDLINE

## (undated) DEVICE — DRAPE,CHEST,FENES,15X10,STERIL: Brand: MEDLINE

## (undated) DEVICE — DECANTER BAG 9": Brand: MEDLINE INDUSTRIES, INC.

## (undated) DEVICE — PLASTICS CHEST BREAST ASU: Brand: MEDLINE INDUSTRIES, INC.

## (undated) DEVICE — PAD,ABDOMINAL,5"X9",ST,LF,25/BX: Brand: MEDLINE INDUSTRIES, INC.

## (undated) DEVICE — BLADE ELECTRODE: Brand: EDGE

## (undated) DEVICE — HANDLE LT SNAP ON ULT DURABLE LENS FOR TRUMPF ALC DISPOSABLE

## (undated) DEVICE — NEEDLE HYPO 21GA L1.5IN INTRAMUSCULAR S STL LATCH BVL UP

## (undated) DEVICE — SYRINGE MED 10ML LUERLOCK TIP W/O SFTY DISP

## (undated) DEVICE — SYR 10ML LUER LOK 1/5ML GRAD --

## (undated) DEVICE — SMOKE EVACUATION PENCIL: Brand: VALLEYLAB

## (undated) DEVICE — NEEDLE HYPO 22GA L1.5IN BLK POLYPR HUB S STL REG BVL STR

## (undated) DEVICE — MINOR BASIN -SMH: Brand: MEDLINE INDUSTRIES, INC.

## (undated) DEVICE — PENCIL SMK EVAC L10FT DIA95MM TBNG NONSTICK W ADPT TO 22MM

## (undated) DEVICE — HEX-LOCKING BLADE ELECTRODE: Brand: EDGE

## (undated) DEVICE — Z INACTIVE USE 2854267 SPONGE GZ W4XL4IN COT 12 PLY TYP VII WVN C FLD DSGN

## (undated) DEVICE — HYPODERMIC SAFETY NEEDLE: Brand: MONOJECT